# Patient Record
Sex: FEMALE | Race: WHITE | Employment: OTHER | URBAN - METROPOLITAN AREA
[De-identification: names, ages, dates, MRNs, and addresses within clinical notes are randomized per-mention and may not be internally consistent; named-entity substitution may affect disease eponyms.]

---

## 2021-09-13 ENCOUNTER — HOSPITAL ENCOUNTER (EMERGENCY)
Facility: CLINIC | Age: 86
Discharge: HOME | End: 2021-09-14
Attending: EMERGENCY MEDICINE
Payer: MEDICARE

## 2021-09-13 ENCOUNTER — APPOINTMENT (OUTPATIENT)
Dept: CT IMAGING | Facility: CLINIC | Age: 86
End: 2021-09-13
Payer: MEDICARE

## 2021-09-13 DIAGNOSIS — I26.99 ACUTE PULMONARY EMBOLISM WITHOUT ACUTE COR PULMONALE, UNSPECIFIED PULMONARY EMBOLISM TYPE (HCC): Primary | ICD-10-CM

## 2021-09-13 LAB
ABSOLUTE EOS #: 0.1 K/UL (ref 0–0.4)
ABSOLUTE IMMATURE GRANULOCYTE: ABNORMAL K/UL (ref 0–0.3)
ABSOLUTE LYMPH #: 1.1 K/UL (ref 1–4.8)
ABSOLUTE MONO #: 1.1 K/UL (ref 0.1–1.2)
ALBUMIN SERPL-MCNC: 3.1 G/DL (ref 3.5–5.2)
ALBUMIN/GLOBULIN RATIO: 0.8 (ref 1–2.5)
ALP BLD-CCNC: 104 U/L (ref 35–104)
ALT SERPL-CCNC: 12 U/L (ref 5–33)
ANION GAP SERPL CALCULATED.3IONS-SCNC: 12 MMOL/L (ref 9–17)
AST SERPL-CCNC: 18 U/L
BASOPHILS # BLD: 0 % (ref 0–2)
BASOPHILS ABSOLUTE: 0 K/UL (ref 0–0.2)
BILIRUB SERPL-MCNC: 0.5 MG/DL (ref 0.3–1.2)
BILIRUBIN DIRECT: 0.15 MG/DL
BILIRUBIN, INDIRECT: 0.35 MG/DL (ref 0–1)
BUN BLDV-MCNC: 20 MG/DL (ref 8–23)
BUN/CREAT BLD: ABNORMAL (ref 9–20)
CALCIUM SERPL-MCNC: 8.7 MG/DL (ref 8.6–10.4)
CHLORIDE BLD-SCNC: 96 MMOL/L (ref 98–107)
CO2: 23 MMOL/L (ref 20–31)
CREAT SERPL-MCNC: 1 MG/DL (ref 0.5–0.9)
D-DIMER QUANTITATIVE: 1.84 MG/L FEU
DIFFERENTIAL TYPE: ABNORMAL
EOSINOPHILS RELATIVE PERCENT: 1 % (ref 1–4)
GFR AFRICAN AMERICAN: >60 ML/MIN
GFR NON-AFRICAN AMERICAN: 52 ML/MIN
GFR SERPL CREATININE-BSD FRML MDRD: ABNORMAL ML/MIN/{1.73_M2}
GFR SERPL CREATININE-BSD FRML MDRD: ABNORMAL ML/MIN/{1.73_M2}
GLOBULIN: ABNORMAL G/DL (ref 1.5–3.8)
GLUCOSE BLD-MCNC: 99 MG/DL (ref 70–99)
HCT VFR BLD CALC: 37.2 % (ref 36–46)
HEMOGLOBIN: 12.1 G/DL (ref 12–16)
IMMATURE GRANULOCYTES: ABNORMAL %
LIPASE: 46 U/L (ref 13–60)
LYMPHOCYTES # BLD: 11 % (ref 24–44)
MCH RBC QN AUTO: 30.4 PG (ref 26–34)
MCHC RBC AUTO-ENTMCNC: 32.4 G/DL (ref 31–37)
MCV RBC AUTO: 93.8 FL (ref 80–100)
MONOCYTES # BLD: 11 % (ref 2–11)
NRBC AUTOMATED: ABNORMAL PER 100 WBC
PDW BLD-RTO: 14 % (ref 12.5–15.4)
PLATELET # BLD: 264 K/UL (ref 140–450)
PLATELET ESTIMATE: ABNORMAL
PMV BLD AUTO: 7.8 FL (ref 6–12)
POTASSIUM SERPL-SCNC: 5.1 MMOL/L (ref 3.7–5.3)
RBC # BLD: 3.97 M/UL (ref 4–5.2)
RBC # BLD: ABNORMAL 10*6/UL
SEG NEUTROPHILS: 77 % (ref 36–66)
SEGMENTED NEUTROPHILS ABSOLUTE COUNT: 7.9 K/UL (ref 1.8–7.7)
SODIUM BLD-SCNC: 131 MMOL/L (ref 135–144)
TOTAL PROTEIN: 6.8 G/DL (ref 6.4–8.3)
TROPONIN INTERP: ABNORMAL
TROPONIN INTERP: ABNORMAL
TROPONIN T: ABNORMAL NG/ML
TROPONIN T: ABNORMAL NG/ML
TROPONIN, HIGH SENSITIVITY: 20 NG/L (ref 0–14)
TROPONIN, HIGH SENSITIVITY: 20 NG/L (ref 0–14)
WBC # BLD: 10.3 K/UL (ref 3.5–11)
WBC # BLD: ABNORMAL 10*3/UL

## 2021-09-13 PROCEDURE — 93005 ELECTROCARDIOGRAM TRACING: CPT | Performed by: EMERGENCY MEDICINE

## 2021-09-13 PROCEDURE — 2580000003 HC RX 258: Performed by: EMERGENCY MEDICINE

## 2021-09-13 PROCEDURE — 96372 THER/PROPH/DIAG INJ SC/IM: CPT

## 2021-09-13 PROCEDURE — 80048 BASIC METABOLIC PNL TOTAL CA: CPT

## 2021-09-13 PROCEDURE — 84484 ASSAY OF TROPONIN QUANT: CPT

## 2021-09-13 PROCEDURE — 85025 COMPLETE CBC W/AUTO DIFF WBC: CPT

## 2021-09-13 PROCEDURE — 71260 CT THORAX DX C+: CPT

## 2021-09-13 PROCEDURE — 83690 ASSAY OF LIPASE: CPT

## 2021-09-13 PROCEDURE — 85379 FIBRIN DEGRADATION QUANT: CPT

## 2021-09-13 PROCEDURE — 99284 EMERGENCY DEPT VISIT MOD MDM: CPT

## 2021-09-13 PROCEDURE — 36415 COLL VENOUS BLD VENIPUNCTURE: CPT

## 2021-09-13 PROCEDURE — 80076 HEPATIC FUNCTION PANEL: CPT

## 2021-09-13 PROCEDURE — 6360000002 HC RX W HCPCS: Performed by: EMERGENCY MEDICINE

## 2021-09-13 PROCEDURE — 6370000000 HC RX 637 (ALT 250 FOR IP): Performed by: NURSE PRACTITIONER

## 2021-09-13 PROCEDURE — 6360000004 HC RX CONTRAST MEDICATION: Performed by: EMERGENCY MEDICINE

## 2021-09-13 RX ORDER — MAGNESIUM SULFATE 1 G/100ML
1000 INJECTION INTRAVENOUS PRN
Status: CANCELLED | OUTPATIENT
Start: 2021-09-13

## 2021-09-13 RX ORDER — POTASSIUM CHLORIDE 20 MEQ/1
40 TABLET, EXTENDED RELEASE ORAL PRN
Status: CANCELLED | OUTPATIENT
Start: 2021-09-13

## 2021-09-13 RX ORDER — LISINOPRIL 40 MG/1
40 TABLET ORAL DAILY
COMMUNITY
Start: 2020-11-06

## 2021-09-13 RX ORDER — ONDANSETRON 2 MG/ML
4 INJECTION INTRAMUSCULAR; INTRAVENOUS EVERY 6 HOURS PRN
Status: CANCELLED | OUTPATIENT
Start: 2021-09-13

## 2021-09-13 RX ORDER — PSEUDOEPHEDRINE HCL 30 MG
100 TABLET ORAL 3 TIMES DAILY PRN
COMMUNITY

## 2021-09-13 RX ORDER — LORATADINE 10 MG/1
10 TABLET ORAL DAILY
Status: ON HOLD | COMMUNITY
Start: 2021-04-06 | End: 2021-09-18

## 2021-09-13 RX ORDER — SODIUM CHLORIDE 0.9 % (FLUSH) 0.9 %
10 SYRINGE (ML) INJECTION PRN
Status: DISCONTINUED | OUTPATIENT
Start: 2021-09-13 | End: 2021-09-14 | Stop reason: HOSPADM

## 2021-09-13 RX ORDER — PSEUDOEPHEDRINE HCL 30 MG
100 TABLET ORAL 3 TIMES DAILY PRN
Status: CANCELLED | OUTPATIENT
Start: 2021-09-13

## 2021-09-13 RX ORDER — SODIUM CHLORIDE 9 MG/ML
25 INJECTION, SOLUTION INTRAVENOUS PRN
Status: CANCELLED | OUTPATIENT
Start: 2021-09-13

## 2021-09-13 RX ORDER — SODIUM CHLORIDE 0.9 % (FLUSH) 0.9 %
5-40 SYRINGE (ML) INJECTION EVERY 12 HOURS SCHEDULED
Status: CANCELLED | OUTPATIENT
Start: 2021-09-13

## 2021-09-13 RX ORDER — ACETAMINOPHEN 650 MG/1
650 SUPPOSITORY RECTAL EVERY 6 HOURS PRN
Status: DISCONTINUED | OUTPATIENT
Start: 2021-09-13 | End: 2021-09-14 | Stop reason: HOSPADM

## 2021-09-13 RX ORDER — MAGNESIUM CITRATE
296 SOLUTION, ORAL ORAL DAILY
Status: ON HOLD | COMMUNITY
End: 2021-09-18

## 2021-09-13 RX ORDER — POLYETHYLENE GLYCOL 3350 17 G/17G
17 POWDER, FOR SOLUTION ORAL DAILY PRN
Status: CANCELLED | OUTPATIENT
Start: 2021-09-13

## 2021-09-13 RX ORDER — AMLODIPINE BESYLATE 5 MG/1
10 TABLET ORAL DAILY
COMMUNITY
Start: 2021-06-09

## 2021-09-13 RX ORDER — SODIUM CHLORIDE 0.9 % (FLUSH) 0.9 %
10 SYRINGE (ML) INJECTION PRN
Status: CANCELLED | OUTPATIENT
Start: 2021-09-13

## 2021-09-13 RX ORDER — POTASSIUM CHLORIDE 7.45 MG/ML
10 INJECTION INTRAVENOUS PRN
Status: CANCELLED | OUTPATIENT
Start: 2021-09-13

## 2021-09-13 RX ORDER — 0.9 % SODIUM CHLORIDE 0.9 %
70 INTRAVENOUS SOLUTION INTRAVENOUS ONCE
Status: COMPLETED | OUTPATIENT
Start: 2021-09-13 | End: 2021-09-13

## 2021-09-13 RX ORDER — ONDANSETRON 4 MG/1
4 TABLET, ORALLY DISINTEGRATING ORAL EVERY 8 HOURS PRN
Status: CANCELLED | OUTPATIENT
Start: 2021-09-13

## 2021-09-13 RX ORDER — ACETAMINOPHEN 325 MG/1
650 TABLET ORAL EVERY 6 HOURS PRN
Status: DISCONTINUED | OUTPATIENT
Start: 2021-09-13 | End: 2021-09-14 | Stop reason: HOSPADM

## 2021-09-13 RX ADMIN — ENOXAPARIN SODIUM 60 MG: 60 INJECTION SUBCUTANEOUS at 18:40

## 2021-09-13 RX ADMIN — ACETAMINOPHEN 650 MG: 325 TABLET ORAL at 23:28

## 2021-09-13 RX ADMIN — SODIUM CHLORIDE 70 ML: 9 INJECTION, SOLUTION INTRAVENOUS at 16:33

## 2021-09-13 RX ADMIN — IOPAMIDOL 75 ML: 755 INJECTION, SOLUTION INTRAVENOUS at 16:33

## 2021-09-13 RX ADMIN — Medication 10 ML: at 16:33

## 2021-09-13 ASSESSMENT — PAIN DESCRIPTION - ORIENTATION: ORIENTATION: RIGHT

## 2021-09-13 ASSESSMENT — ENCOUNTER SYMPTOMS
ABDOMINAL PAIN: 0
RHINORRHEA: 0
SHORTNESS OF BREATH: 0
SORE THROAT: 0
CHEST TIGHTNESS: 1
VOMITING: 0
PHOTOPHOBIA: 0
CONSTIPATION: 1
NAUSEA: 0
DIARRHEA: 0
BACK PAIN: 0
COUGH: 0

## 2021-09-13 ASSESSMENT — PAIN SCALES - GENERAL
PAINLEVEL_OUTOF10: 7
PAINLEVEL_OUTOF10: 0

## 2021-09-13 ASSESSMENT — PAIN DESCRIPTION - LOCATION: LOCATION: CHEST

## 2021-09-13 ASSESSMENT — PAIN DESCRIPTION - FREQUENCY: FREQUENCY: CONTINUOUS

## 2021-09-13 ASSESSMENT — PAIN DESCRIPTION - DESCRIPTORS: DESCRIPTORS: ACHING

## 2021-09-13 NOTE — ED PROVIDER NOTES
[address] [place] [address] ED  15 [facility]  Phone: [phone]        Pt Name: Shobha English  MRN: [id number]  [place] 7/29/1930  Date of evaluation: 9/13/21      CHIEF COMPLAINT     Chief Complaint   Patient presents with    Fatigue    Shortness of Breath    Chest Pain     check her pneumonia   She was in the hospital 24 days prior for 6 days, today she started having chest pain under her right breast.          HISTORY OF PRESENT ILLNESS  (Location/Symptom, Timing/Onset, Context/Setting, Quality, Duration, Modifying Factors, Severity.)    Shobha English is a 80 y.o. female who presents with right upper quadrant abdominal pain. She has exacerbation of the pain with breathing and moving. No fever or chills. She is not nauseated. No hemoptysis. She has been light-headed. She had Covid-19 and spent 6 days in the hospital. She was diagnosed on 8/27/21. She was admitted at [facility] for [address] Her symptoms started on 8/20/21. She is unvaccinated. No diarrhea. She denies shortness of breath. The patient has no prior history of venous thromboembolism. No recent travel. No recent surgery. No leg swelling. No hemoptysis. No estrogen containing medications. No history of malignancy. REVIEW OF SYSTEMS    (2-9 systems for level 4, 10 or more for level 5)     Review of Systems   Constitutional: Positive for chills. Negative for fever. HENT: Negative for congestion, rhinorrhea and sore throat. Eyes: Negative for photophobia and visual disturbance. Respiratory: Positive for chest tightness. Negative for cough and shortness of breath. Cardiovascular: Positive for chest pain and palpitations. Negative for leg swelling. Gastrointestinal: Positive for constipation. Negative for abdominal pain, diarrhea, nausea and vomiting. Genitourinary: Negative for dysuria, frequency and urgency. Musculoskeletal: Negative for back pain and neck pain. Skin: Negative for rash and wound. Neurological: Positive for dizziness and light-headedness. Negative for headaches. Hematological: Negative for adenopathy. Does not bruise/bleed easily. PAST MEDICAL HISTORY    has a past medical history of Chronic kidney disease, COVID-19, Hyperlipidemia, and Hypertension. Pelvic fracture    SURGICAL HISTORY     Cholecystectomy     CURRENTMEDICATIONS       Previous Medications    AMLODIPINE (NORVASC) 5 MG TABLET    Take 5 mg by mouth daily    DOCUSATE (COLACE, DULCOLAX) 100 MG CAPS    Take 100 mg by mouth 3 times daily as needed    LISINOPRIL (PRINIVIL;ZESTRIL) 40 MG TABLET    Take 40 mg by mouth daily    LORATADINE (CLARITIN) 10 MG TABLET    Take 10 mg by mouth daily    MAGNESIUM CITRATE (CITROMA) SOLN    Take 296 mLs by mouth once    MULTIPLE VITAMINS-MINERALS (MULTIVITAMIN ADULTS PO)    Take 1 capsule by mouth daily    VITAMIN D (CHOLECALCIFEROL) 125 MCG (5000 UT) CAPS CAPSULE    Take 5,000 Units by mouth daily       ALLERGIES     is allergic to codeine and morphine. FAMILY HISTORY     has no family status information on file. family history is not on file. SOCIAL HISTORY      reports that she has never smoked. She has never used smokeless tobacco. She reports that she does not drink alcohol and does not use drugs. PHYSICAL EXAM    (up to 7 for level 4, 8 or more for level 5)   INITIAL VITALS:  height is 5' 3\" (1.6 m) and weight is 57.6 kg (127 lb). Her oral temperature is 98.2 °F (36.8 °C). Her blood pressure is 121/51 (abnormal) and her pulse is 62. Her respiration is 14 and oxygen saturation is 95%. Physical Exam  Vitals and nursing note reviewed. Constitutional:       Appearance: She is well-developed. HENT:      Head: Normocephalic and atraumatic. Cardiovascular:      Rate and Rhythm: Normal rate and regular rhythm. Heart sounds: No murmur heard. No friction rub. No gallop.     Pulmonary:      Effort: Pulmonary effort is normal.      Breath sounds: Normal breath sounds. No decreased breath sounds, wheezing, rhonchi or rales. Chest:      Chest wall: No tenderness. Abdominal:      Palpations: Abdomen is soft. Musculoskeletal:         General: Normal range of motion. Cervical back: Normal range of motion and neck supple. Right lower leg: No tenderness. No edema. Left lower leg: No tenderness. No edema. Skin:     General: Skin is warm and dry. Capillary Refill: Capillary refill takes less than 2 seconds. Neurological:      General: No focal deficit present. Mental Status: She is alert and oriented to person, place, and time. Psychiatric:         Mood and Affect: Mood normal.         Behavior: Behavior normal.         DIFFERENTIAL DIAGNOSIS/ MDM:     Acute pulmonary embolism. Plan for admission. DIAGNOSTIC RESULTS     EKG: All EKG's are interpreted by the Emergency Department Physician who either signs or Co-signs this chart in the absence of a cardiologist.    EKG Interpretation    Interpreted by emergency department physician    Rhythm: normal sinus   Rate: normal  Axis: left  Ectopy: none  Conduction: right bundle branch block (complete) and left anterior fasciclar block  ST Segments: nonspecific changes and elevation in  v2  T Waves: non specific changes  Q Waves: none    Clinical Impression: non-specific EKG    Winston Plata MD      RADIOLOGY:        Interpretation per the Radiologist below, if available at the time of this note:    CT CHEST PULMONARY EMBOLISM W CONTRAST    Result Date: 9/13/2021  EXAMINATION: CTA OF THE CHEST 9/13/2021 10:14 am TECHNIQUE: CTA of the chest was performed after the administration of intravenous contrast.  Multiplanar reformatted images are provided for review. MIP images are provided for review. Dose modulation, iterative reconstruction, and/or weight based adjustment of the mA/kV was utilized to reduce the radiation dose to as low as reasonably achievable. COMPARISON: None.  HISTORY: ORDERING SYSTEM PROVIDED HISTORY: r/o PE TECHNOLOGIST PROVIDED HISTORY: r/o PE Decision Support Exception - unselect if not a suspected or confirmed emergency medical condition->Emergency Medical Condition (MA) Reason for Exam: Elevated D Dimer, fatigue, SOB, chest pain Acuity: Acute Type of Exam: Initial FINDINGS: Pulmonary Arteries: Pulmonary arteries are adequately opacified for evaluation. Filling defects are present within right upper lobe segmental and subsegmental branches, and within the right and left lower lobe segmental and subsegmental branches, consistent with multiple acute emboli. RV to LV ratio measures 0.93. Main pulmonary artery is enlarged, measuring 34 mm, suggesting pulmonary arterial hypertension. No filling defects are present within the main or central pulmonary arteries. .  Main pulmonary artery is normal in caliber. Mediastinum: A 1.3 cm low-attenuation nodule is present within the left lobe of the thyroid gland. Extensive coronary arterial calcifications are noted. Small pericardial effusion is present. No evidence of mediastinal lymphadenopathy. The heart and pericardium demonstrate no acute abnormality. There is no acute abnormality of the thoracic aorta. Lungs/pleura: Scattered pulmonary opacities are present bilaterally, with a lower lobe predominance. Small pleural effusions are present. No pneumothorax is noted. Upper Abdomen: Images through the upper abdomen do not demonstrate the left kidney, which may be surgically absent or ectopic in location. Soft Tissues/Bones: No acute bone or soft tissue abnormality. 1. Segmental and subsegmental pulmonary emboli within the right upper lobe, and right and left lower lobes, without evidence of RV strain 2. Multiple ground-glass opacities and areas of consolidation bilaterally, differential considerations include COVID-19 pulmonary disease versus multifocal pneumonia 3. Small bilateral pleural effusions 4.  Extensive coronary arterial calcifications 5. Critical results were called by Dr. Arely Hall to Dr Batsheva Nation on 9/13/2021 at 5:12 p.m. hours. RECOMMENDATIONS: 1.3 cm incidental left thyroid nodule. No follow-up imaging is recommended. Reference: J Am Trina Radiol.  2015 Feb;12(2): 143-50       LABS:  Results for orders placed or performed during the hospital encounter of 94/90/25   Basic Metabolic Panel   Result Value Ref Range    Glucose 99 70 - 99 mg/dL    BUN 20 8 - 23 mg/dL    CREATININE 1.00 (H) 0.50 - 0.90 mg/dL    Bun/Cre Ratio NOT REPORTED 9 - 20    Calcium 8.7 8.6 - 10.4 mg/dL    Sodium 131 (L) 135 - 144 mmol/L    Potassium 5.1 3.7 - 5.3 mmol/L    Chloride 96 (L) 98 - 107 mmol/L    CO2 23 20 - 31 mmol/L    Anion Gap 12 9 - 17 mmol/L    GFR Non-African American 52 (L) >60 mL/min    GFR African American >60 >60 mL/min    GFR Comment          GFR Staging NOT REPORTED    D-Dimer, Quantitative   Result Value Ref Range    D-Dimer, Quant 1.84 mg/L FEU   CBC Auto Differential   Result Value Ref Range    WBC 10.3 3.5 - 11.0 k/uL    RBC 3.97 (L) 4.0 - 5.2 m/uL    Hemoglobin 12.1 12.0 - 16.0 g/dL    Hematocrit 37.2 36 - 46 %    MCV 93.8 80 - 100 fL    MCH 30.4 26 - 34 pg    MCHC 32.4 31 - 37 g/dL    RDW 14.0 12.5 - 15.4 %    Platelets 249 429 - 703 k/uL    MPV 7.8 6.0 - 12.0 fL    NRBC Automated NOT REPORTED per 100 WBC    Differential Type NOT REPORTED     Seg Neutrophils 77 (H) 36 - 66 %    Lymphocytes 11 (L) 24 - 44 %    Monocytes 11 2 - 11 %    Eosinophils % 1 1 - 4 %    Basophils 0 0 - 2 %    Immature Granulocytes NOT REPORTED 0 %    Segs Absolute 7.90 (H) 1.8 - 7.7 k/uL    Absolute Lymph # 1.10 1.0 - 4.8 k/uL    Absolute Mono # 1.10 0.1 - 1.2 k/uL    Absolute Eos # 0.10 0.0 - 0.4 k/uL    Basophils Absolute 0.00 0.0 - 0.2 k/uL    Absolute Immature Granulocyte NOT REPORTED 0.00 - 0.30 k/uL    WBC Morphology NOT REPORTED     RBC Morphology NOT REPORTED     Platelet Estimate NOT REPORTED    Troponin   Result Value Ref Range    Troponin, mis-transcribed.)    Perry Keyes MD  Attending Emergency Medicine Physician       Perry Keyes MD  09/13/21 0667

## 2021-09-13 NOTE — ED NOTES
[facility] Access calls back stating that all [facility] facilities are closed to transfers, [facility] to [facility] for transfer per patient and families 2nd request      Chela Bowen RN  09/13/21 6580

## 2021-09-13 NOTE — ED NOTES
Dr. Brian Duran speaks with Ellen Erazo regarding transfer to [address] [place] [address], RN  09/13/21 8404

## 2021-09-13 NOTE — ED NOTES
Kavon Jarquin RN to bedside to attempt IV start for CT scan      Jonathan Hebert, ANGELA  09/13/21 5856

## 2021-09-14 VITALS
SYSTOLIC BLOOD PRESSURE: 133 MMHG | BODY MASS INDEX: 22.5 KG/M2 | RESPIRATION RATE: 18 BRPM | HEIGHT: 63 IN | DIASTOLIC BLOOD PRESSURE: 57 MMHG | TEMPERATURE: 98.2 F | HEART RATE: 82 BPM | WEIGHT: 127 LBS | OXYGEN SATURATION: 96 %

## 2021-09-14 LAB
ANION GAP SERPL CALCULATED.3IONS-SCNC: 9 MMOL/L (ref 9–17)
BUN BLDV-MCNC: 15 MG/DL (ref 8–23)
BUN/CREAT BLD: ABNORMAL (ref 9–20)
CALCIUM SERPL-MCNC: 8.5 MG/DL (ref 8.6–10.4)
CHLORIDE BLD-SCNC: 99 MMOL/L (ref 98–107)
CO2: 25 MMOL/L (ref 20–31)
CREAT SERPL-MCNC: 1 MG/DL (ref 0.5–0.9)
EKG ATRIAL RATE: 64 BPM
EKG P AXIS: 77 DEGREES
EKG P-R INTERVAL: 158 MS
EKG Q-T INTERVAL: 422 MS
EKG QRS DURATION: 130 MS
EKG QTC CALCULATION (BAZETT): 435 MS
EKG R AXIS: -46 DEGREES
EKG T AXIS: 68 DEGREES
EKG VENTRICULAR RATE: 64 BPM
GFR AFRICAN AMERICAN: >60 ML/MIN
GFR NON-AFRICAN AMERICAN: 52 ML/MIN
GFR SERPL CREATININE-BSD FRML MDRD: ABNORMAL ML/MIN/{1.73_M2}
GFR SERPL CREATININE-BSD FRML MDRD: ABNORMAL ML/MIN/{1.73_M2}
GLUCOSE BLD-MCNC: 82 MG/DL (ref 70–99)
HCT VFR BLD CALC: 34.6 % (ref 36–46)
HEMOGLOBIN: 11.5 G/DL (ref 12–16)
INR BLD: 0.9
MCH RBC QN AUTO: 30.2 PG (ref 26–34)
MCHC RBC AUTO-ENTMCNC: 33.3 G/DL (ref 31–37)
MCV RBC AUTO: 90.6 FL (ref 80–100)
NRBC AUTOMATED: ABNORMAL PER 100 WBC
PDW BLD-RTO: 13.2 % (ref 12.5–15.4)
PLATELET # BLD: 188 K/UL (ref 140–450)
PMV BLD AUTO: 8.1 FL (ref 6–12)
POTASSIUM SERPL-SCNC: 4.6 MMOL/L (ref 3.7–5.3)
PROTHROMBIN TIME: 9.2 SEC (ref 9.4–12.6)
RBC # BLD: 3.81 M/UL (ref 4–5.2)
SODIUM BLD-SCNC: 133 MMOL/L (ref 135–144)
WBC # BLD: 6.7 K/UL (ref 3.5–11)

## 2021-09-14 PROCEDURE — 85610 PROTHROMBIN TIME: CPT

## 2021-09-14 PROCEDURE — 6370000000 HC RX 637 (ALT 250 FOR IP): Performed by: NURSE PRACTITIONER

## 2021-09-14 PROCEDURE — 82805 BLOOD GASES W/O2 SATURATION: CPT

## 2021-09-14 PROCEDURE — 85027 COMPLETE CBC AUTOMATED: CPT

## 2021-09-14 PROCEDURE — 80048 BASIC METABOLIC PNL TOTAL CA: CPT

## 2021-09-14 RX ORDER — CETIRIZINE HYDROCHLORIDE 10 MG/1
10 TABLET ORAL DAILY
Status: DISCONTINUED | OUTPATIENT
Start: 2021-09-14 | End: 2021-09-14 | Stop reason: HOSPADM

## 2021-09-14 RX ORDER — LISINOPRIL 10 MG/1
40 TABLET ORAL DAILY
Status: DISCONTINUED | OUTPATIENT
Start: 2021-09-14 | End: 2021-09-14 | Stop reason: HOSPADM

## 2021-09-14 RX ORDER — AMLODIPINE BESYLATE 5 MG/1
5 TABLET ORAL DAILY
Status: DISCONTINUED | OUTPATIENT
Start: 2021-09-14 | End: 2021-09-14 | Stop reason: HOSPADM

## 2021-09-14 RX ADMIN — APIXABAN 10 MG: 5 TABLET, FILM COATED ORAL at 09:24

## 2021-09-14 RX ADMIN — LISINOPRIL 40 MG: 10 TABLET ORAL at 09:25

## 2021-09-14 RX ADMIN — AMLODIPINE BESYLATE 5 MG: 5 TABLET ORAL at 09:25

## 2021-09-14 NOTE — ED NOTES
Dr. Kim Pate speaks with Miryam Hernandez at [facility] for updated patient status and ability to discharge from here to home after successful start of eliquis, Dr. Kim Pate to bedside to reassess patient and update on plan to discharge home, patient and family are agreeable to this plan, [place] [facility] notified of plan to cancel admission bed       Mitch Davis RN  09/14/21 1786

## 2021-09-14 NOTE — ED PROVIDER NOTES
[facility] ED  15 [facility]  Phone: [phone]        ADDENDUM:      Care of this patient was assumed from Dr. Trevor Tello. The patient was seen for Fatigue, Shortness of Breath, and Chest Pain (check her pneumonia   She was in the hospital 24 days prior for 6 days, today she started having chest pain under her right breast. )  . The patient's initial evaluation and plan have been discussed with the prior provider who initially evaluated the patient. Nursing Notes, Past Medical Hx, Past Surgical Hx, Allergies, were all reviewed. PAST MEDICAL HISTORY    has a past medical history of Chronic kidney disease, COVID-19, Hyperlipidemia, and Hypertension. SURGICAL HISTORY      has no past surgical history on file. CURRENT MEDICATIONS       Previous Medications    AMLODIPINE (NORVASC) 5 MG TABLET    Take 5 mg by mouth daily    DOCUSATE (COLACE, DULCOLAX) 100 MG CAPS    Take 100 mg by mouth 3 times daily as needed    LISINOPRIL (PRINIVIL;ZESTRIL) 40 MG TABLET    Take 40 mg by mouth daily    LORATADINE (CLARITIN) 10 MG TABLET    Take 10 mg by mouth daily    MAGNESIUM CITRATE (CITROMA) SOLN    Take 296 mLs by mouth once    MULTIPLE VITAMINS-MINERALS (MULTIVITAMIN ADULTS PO)    Take 1 capsule by mouth daily    VITAMIN D (CHOLECALCIFEROL) 125 MCG (5000 UT) CAPS CAPSULE    Take 5,000 Units by mouth daily       ALLERGIES     is allergic to codeine and morphine. Diagnostic Results     EKG: All EKG's are interpreted by the Emergency Department Physician who either signs or Co-signs this chart in the [address] a cardiologist.      Interpreted by Janis Zamora MD     Rhythm: normal sinus   Rate: 64  Axis: -46  Ectopy: none  Conduction: Bifascicular block  ST Segments: no acute change  T Waves: no acute change  Q Waves: none    Clinical Impression: normal sinus rhythm with no acute changes/normal EKG. No acute infarction/ischemia noted.       LABS:   Results for orders placed or performed during the hospital encounter of 46/07/24   Basic Metabolic Panel   Result Value Ref Range    Glucose 99 70 - 99 mg/dL    BUN 20 8 - 23 mg/dL    CREATININE 1.00 (H) 0.50 - 0.90 mg/dL    Bun/Cre Ratio NOT REPORTED 9 - 20    Calcium 8.7 8.6 - 10.4 mg/dL    Sodium 131 (L) 135 - 144 mmol/L    Potassium 5.1 3.7 - 5.3 mmol/L    Chloride 96 (L) 98 - 107 mmol/L    CO2 23 20 - 31 mmol/L    Anion Gap 12 9 - 17 mmol/L    GFR Non-African American 52 (L) >60 mL/min    GFR African American >60 >60 mL/min    GFR Comment          GFR Staging NOT REPORTED    D-Dimer, Quantitative   Result Value Ref Range    D-Dimer, Quant 1.84 mg/L FEU   CBC Auto Differential   Result Value Ref Range    WBC 10.3 3.5 - 11.0 k/uL    RBC 3.97 (L) 4.0 - 5.2 m/uL    Hemoglobin 12.1 12.0 - 16.0 g/dL    Hematocrit 37.2 36 - 46 %    MCV 93.8 80 - 100 fL    MCH 30.4 26 - 34 pg    MCHC 32.4 31 - 37 g/dL    RDW 14.0 12.5 - 15.4 %    Platelets 263 725 - 098 k/uL    MPV 7.8 6.0 - 12.0 fL    NRBC Automated NOT REPORTED per 100 WBC    Differential Type NOT REPORTED     Seg Neutrophils 77 (H) 36 - 66 %    Lymphocytes 11 (L) 24 - 44 %    Monocytes 11 2 - 11 %    Eosinophils % 1 1 - 4 %    Basophils 0 0 - 2 %    Immature Granulocytes NOT REPORTED 0 %    Segs Absolute 7.90 (H) 1.8 - 7.7 k/uL    Absolute Lymph # 1.10 1.0 - 4.8 k/uL    Absolute Mono # 1.10 0.1 - 1.2 k/uL    Absolute Eos # 0.10 0.0 - 0.4 k/uL    Basophils Absolute 0.00 0.0 - 0.2 k/uL    Absolute Immature Granulocyte NOT REPORTED 0.00 - 0.30 k/uL    WBC Morphology NOT REPORTED     RBC Morphology NOT REPORTED     Platelet Estimate NOT REPORTED    Troponin   Result Value Ref Range    Troponin, High Sensitivity 20 (H) 0 - 14 ng/L    Troponin T NOT REPORTED <0.03 ng/mL    Troponin Interp NOT REPORTED    Lipase   Result Value Ref Range    Lipase 46 13 - 60 U/L   Hepatic Function Panel   Result Value Ref Range    Albumin 3.1 (L) 3.5 - 5.2 g/dL    Alkaline Phosphatase 104 35 - 104 U/L    ALT 12 5 - 33 U/L    AST 18 <32 U/L    Total Bilirubin 0.50 0.3 - 1.2 mg/dL    Bilirubin, Direct 0.15 <0.31 mg/dL    Bilirubin, Indirect 0.35 0.00 - 1.00 mg/dL    Total Protein 6.8 6.4 - 8.3 g/dL    Globulin NOT REPORTED 1.5 - 3.8 g/dL    Albumin/Globulin Ratio 0.8 (L) 1.0 - 2.5   Troponin   Result Value Ref Range    Troponin, High Sensitivity 20 (H) 0 - 14 ng/L    Troponin T NOT REPORTED <0.03 ng/mL    Troponin Interp NOT REPORTED    Basic Metabolic Panel w/ Reflex to MG   Result Value Ref Range    Glucose 82 70 - 99 mg/dL    BUN 15 8 - 23 mg/dL    CREATININE 1.00 (H) 0.50 - 0.90 mg/dL    Bun/Cre Ratio NOT REPORTED 9 - 20    Calcium 8.5 (L) 8.6 - 10.4 mg/dL    Sodium 133 (L) 135 - 144 mmol/L    Potassium 4.6 3.7 - 5.3 mmol/L    Chloride 99 98 - 107 mmol/L    CO2 25 20 - 31 mmol/L    Anion Gap 9 9 - 17 mmol/L    GFR Non-African American 52 (L) >60 mL/min    GFR African American >60 >60 mL/min    GFR Comment          GFR Staging NOT REPORTED    CBC   Result Value Ref Range    WBC 6.7 3.5 - 11.0 k/uL    RBC 3.81 (L) 4.0 - 5.2 m/uL    Hemoglobin 11.5 (L) 12.0 - 16.0 g/dL    Hematocrit 34.6 (L) 36 - 46 %    MCV 90.6 80 - 100 fL    MCH 30.2 26 - 34 pg    MCHC 33.3 31 - 37 g/dL    RDW 13.2 12.5 - 15.4 %    Platelets 597 536 - 776 k/uL    MPV 8.1 6.0 - 12.0 fL    NRBC Automated NOT REPORTED per 100 WBC   Protime-INR   Result Value Ref Range    Protime 9.2 (L) 9.4 - 12.6 sec    INR 0.9    EKG 12 Lead   Result Value Ref Range    Ventricular Rate 64 BPM    Atrial Rate 64 BPM    P-R Interval 158 ms    QRS Duration 130 ms    Q-T Interval 422 ms    QTc Calculation (Bazett) 435 ms    P Axis 77 degrees    R Axis -46 degrees    T Axis 68 degrees       RADIOLOGY:  CT CHEST PULMONARY EMBOLISM W CONTRAST   Final Result   1. Segmental and subsegmental pulmonary emboli within the right upper lobe,   and right and left lower lobes, without evidence of RV strain   2.  Multiple ground-glass opacities and areas of consolidation bilaterally,   differential considerations include COVID-19 pulmonary disease versus   multifocal pneumonia   3. Small bilateral pleural effusions   4. Extensive coronary arterial calcifications   5. Critical results were called by Dr. Henri Parikh to Dr Claude Comber on   9/13/2021 at 5:12 p.m. hours. RECOMMENDATIONS:   1.3 cm incidental left thyroid nodule. No follow-up imaging is recommended. Reference: J Am Trina Radiol.  2015 Feb;12(2): 143-50             RECENT VITALS:  BP: (!) 113/52, Temp: 98.2 °F (36.8 °C), Pulse: 64, Resp: 16     ED Course     The patient was given the following medications:  Orders Placed This Encounter   Medications    0.9 % sodium chloride bolus    iopamidol (ISOVUE-370) 76 % injection 75 mL    sodium chloride flush 0.9 % injection 10 mL    DISCONTD: enoxaparin (LOVENOX) injection 60 mg    enoxaparin (LOVENOX) injection 60 mg    amLODIPine (NORVASC) tablet 5 mg    lisinopril (PRINIVIL;ZESTRIL) tablet 40 mg    cetirizine (ZYRTEC) tablet 10 mg    OR Linked Order Group     acetaminophen (TYLENOL) tablet 650 mg     acetaminophen (TYLENOL) suppository 650 mg    apixaban (ELIQUIS) tablet 10 mg    apixaban (ELIQUIS) 5 MG TABS tablet     Sig: Take 2 tablets by mouth 2 times daily for 7 days     Dispense:  28 tablet     Refill:  0       Medical Decision Making           Our hospitalist service was called as the patient has been in this emergency department for greater than 24 hours she is already transition to Eliquis she has not been tachycardic she has not been hypoxic they are recommending that we discharge the patient from the emergency department to continue the Eliquis for the pulmonary emboli on examination the patient's only complaint is of right rib pain which is what originally brought her in no other chest pain no shortness of breath no fever no chills no vomiting I discussed this with the patient she is agreeable with this plan on my examination the head is normocephalic ears nose throat all without obvious mass lesion or deformity the neck is supple trachea midline no adenopathy cardiac S1-S2 with a regular rate and rhythm pulmonary clear to auscultation bilateral abdomen is soft nontender nondistended with positive bowel sounds extremities warm dry good pulses motor sensation throughout the skin is without rashes or lesions neurologic GCS is 15 and no focal deficits are appreciated    EMERGENCY DEPARTMENT COURSE:   Vitals:    Vitals:    09/14/21 0726 09/14/21 0928 09/14/21 1011 09/14/21 1125   BP: (!) 135/57 (!) 122/54 (!) 117/46 (!) 113/52   Pulse: 66 73 70 64   Resp: 16 18 19 16   Temp:       TempSrc:       SpO2: 96% 98% 96% 95%   Weight:       Height:         -------------------------  BP: (!) 113/52, Temp: 98.2 °F (36.8 °C), Pulse: 64, Resp: 16      RE-EVALUATION:  At this time the patient is without objective evidence of an acute process requiring hospitalization or inpatient management. They have remained hemodynamically stable throughout their entire ED visit and are stable for discharge with outpatient follow-up. The patient understands that at this time there is no evidence for a more malignant underlying process, but the patient also understands that early in the process of an illness or injury, an emergency department workup can be falsely reassuring. Routine discharge counseling was given, and the patient understands that worsening, changing or persistent symptoms should prompt an immediate call or follow up with their primary physician or return to the emergency department. The importance of appropriate follow up was also discussed. I have reviewed the disposition diagnosis with the patient and or their family/guardian. I have answered their questions and given discharge instructions. They voiced understanding of these instructions and did not have any further questions or complaints. PROCEDURES:  None    FINAL IMPRESSION      1.  Acute pulmonary embolism without acute cor pulmonale, unspecified pulmonary embolism type [facility])          DISPOSITION/PLAN   DISPOSITION Decision To Discharge 09/14/2021 01:37:24 PM      CONDITION ON DISPOSITION:   Stable    PATIENT REFERRED TO:  Saud Hughes  [address] [place] [address], [place] [address]  [phone]    Call today        DISCHARGE MEDICATIONS:  New Prescriptions    APIXABAN (ELIQUIS) 5 MG TABS TABLET    Take 2 tablets by mouth 2 times daily for 7 days       (Please note that portions of this note were completed with a voicerecognition program.  Efforts were made to edit the dictations but occasionally words are mis-transcribed.)    Hanane Almonte MD,, MD, F.A.C.E.P.   Attending Emergency Medicine Physician                   Hanane Almonte MD  09/14/21 1848

## 2021-09-14 NOTE — ED NOTES
Pt resting on stretcher with eyes closed, no acute distress noted, continue to monitor      Chela ANGELA Bowen  09/14/21 1291

## 2021-09-14 NOTE — ED NOTES
Pt up to restroom with assistance no distress noted, returned to stretcher, continue to monitor      Rose Marie Rich RN  09/14/21 0240

## 2021-09-14 NOTE — ED NOTES
Bed board contacted regarding status of admission bed, updated by Holley Sandhoff that bed is assigned although is awaiting cleaning and should be done in approximately 2 hours, patient and family updated      Yessenia Alanis RN  09/14/21 1200

## 2021-09-14 NOTE — ED NOTES
Report received from [facility], pt resting on stretcher without further complaints at present time, provided with warm blankets for comfort, updated on plan of care, continue to await admission ed at [facility], vitals as charted      Lindsay Galicia RN  09/14/21 8902

## 2021-09-14 NOTE — ED NOTES
Family updated on plan of care, continue to await admission bed at [facility], pt resting on stretcher with eyes closed, resp even and non-labored, continue to monitor, vitals as charted      Jared Potter RN  09/14/21 2604

## 2021-09-17 ENCOUNTER — HOSPITAL ENCOUNTER (INPATIENT)
Age: 86
LOS: 3 days | Discharge: HOME HEALTH | DRG: 175 | End: 2021-09-20
Attending: EMERGENCY MEDICINE | Admitting: INTERNAL MEDICINE
Payer: MEDICARE

## 2021-09-17 ENCOUNTER — APPOINTMENT (OUTPATIENT)
Dept: CT IMAGING | Facility: CLINIC | Age: 86
DRG: 175 | End: 2021-09-17
Payer: MEDICARE

## 2021-09-17 DIAGNOSIS — R06.03 ACUTE RESPIRATORY DISTRESS: ICD-10-CM

## 2021-09-17 DIAGNOSIS — I26.99 ACUTE PULMONARY EMBOLISM, UNSPECIFIED PULMONARY EMBOLISM TYPE, UNSPECIFIED WHETHER ACUTE COR PULMONALE PRESENT (HCC): ICD-10-CM

## 2021-09-17 DIAGNOSIS — I26.09 ACUTE PULMONARY EMBOLISM WITH ACUTE COR PULMONALE, UNSPECIFIED PULMONARY EMBOLISM TYPE (HCC): Primary | ICD-10-CM

## 2021-09-17 LAB
ABSOLUTE EOS #: 0.1 K/UL (ref 0–0.4)
ABSOLUTE IMMATURE GRANULOCYTE: ABNORMAL K/UL (ref 0–0.3)
ABSOLUTE LYMPH #: 1 K/UL (ref 1–4.8)
ABSOLUTE MONO #: 0.5 K/UL (ref 0.1–1.2)
ALBUMIN SERPL-MCNC: 3.3 G/DL (ref 3.5–5.2)
ALBUMIN/GLOBULIN RATIO: 1 (ref 1–2.5)
ALP BLD-CCNC: 107 U/L (ref 35–104)
ALT SERPL-CCNC: 16 U/L (ref 5–33)
ANION GAP SERPL CALCULATED.3IONS-SCNC: 10 MMOL/L (ref 9–17)
AST SERPL-CCNC: 17 U/L
BASOPHILS # BLD: 1 % (ref 0–2)
BASOPHILS ABSOLUTE: 0 K/UL (ref 0–0.2)
BILIRUB SERPL-MCNC: 0.2 MG/DL (ref 0.3–1.2)
BNP INTERPRETATION: ABNORMAL
BUN BLDV-MCNC: 19 MG/DL (ref 8–23)
BUN/CREAT BLD: ABNORMAL (ref 9–20)
CALCIUM SERPL-MCNC: 9.2 MG/DL (ref 8.6–10.4)
CHLORIDE BLD-SCNC: 100 MMOL/L (ref 98–107)
CO2: 23 MMOL/L (ref 20–31)
CREAT SERPL-MCNC: 1.1 MG/DL (ref 0.5–0.9)
DIFFERENTIAL TYPE: ABNORMAL
EOSINOPHILS RELATIVE PERCENT: 2 % (ref 1–4)
GFR AFRICAN AMERICAN: 56 ML/MIN
GFR NON-AFRICAN AMERICAN: 47 ML/MIN
GFR SERPL CREATININE-BSD FRML MDRD: ABNORMAL ML/MIN/{1.73_M2}
GFR SERPL CREATININE-BSD FRML MDRD: ABNORMAL ML/MIN/{1.73_M2}
GLUCOSE BLD-MCNC: 107 MG/DL (ref 70–99)
HCT VFR BLD CALC: 34.9 % (ref 36–46)
HEMOGLOBIN: 11.7 G/DL (ref 12–16)
IMMATURE GRANULOCYTES: ABNORMAL %
LYMPHOCYTES # BLD: 16 % (ref 24–44)
MCH RBC QN AUTO: 30.6 PG (ref 26–34)
MCHC RBC AUTO-ENTMCNC: 33.5 G/DL (ref 31–37)
MCV RBC AUTO: 91.3 FL (ref 80–100)
MONOCYTES # BLD: 8 % (ref 2–11)
NRBC AUTOMATED: ABNORMAL PER 100 WBC
PDW BLD-RTO: 14 % (ref 12.5–15.4)
PLATELET # BLD: 316 K/UL (ref 140–450)
PLATELET ESTIMATE: ABNORMAL
PMV BLD AUTO: 7.7 FL (ref 6–12)
POTASSIUM SERPL-SCNC: 4.7 MMOL/L (ref 3.7–5.3)
PRO-BNP: 631 PG/ML
RBC # BLD: 3.82 M/UL (ref 4–5.2)
RBC # BLD: ABNORMAL 10*6/UL
SEG NEUTROPHILS: 73 % (ref 36–66)
SEGMENTED NEUTROPHILS ABSOLUTE COUNT: 4.7 K/UL (ref 1.8–7.7)
SODIUM BLD-SCNC: 133 MMOL/L (ref 135–144)
TOTAL PROTEIN: 6.5 G/DL (ref 6.4–8.3)
TROPONIN INTERP: ABNORMAL
TROPONIN T: ABNORMAL NG/ML
TROPONIN, HIGH SENSITIVITY: 20 NG/L (ref 0–14)
WBC # BLD: 6.4 K/UL (ref 3.5–11)
WBC # BLD: ABNORMAL 10*3/UL

## 2021-09-17 PROCEDURE — 2060000000 HC ICU INTERMEDIATE R&B

## 2021-09-17 PROCEDURE — 99284 EMERGENCY DEPT VISIT MOD MDM: CPT

## 2021-09-17 PROCEDURE — 93005 ELECTROCARDIOGRAM TRACING: CPT | Performed by: EMERGENCY MEDICINE

## 2021-09-17 PROCEDURE — 96372 THER/PROPH/DIAG INJ SC/IM: CPT

## 2021-09-17 PROCEDURE — 6360000002 HC RX W HCPCS: Performed by: EMERGENCY MEDICINE

## 2021-09-17 PROCEDURE — 84484 ASSAY OF TROPONIN QUANT: CPT

## 2021-09-17 PROCEDURE — 80053 COMPREHEN METABOLIC PANEL: CPT

## 2021-09-17 PROCEDURE — 83880 ASSAY OF NATRIURETIC PEPTIDE: CPT

## 2021-09-17 PROCEDURE — 2580000003 HC RX 258: Performed by: EMERGENCY MEDICINE

## 2021-09-17 PROCEDURE — 85025 COMPLETE CBC W/AUTO DIFF WBC: CPT

## 2021-09-17 PROCEDURE — 71260 CT THORAX DX C+: CPT

## 2021-09-17 PROCEDURE — 6360000004 HC RX CONTRAST MEDICATION: Performed by: EMERGENCY MEDICINE

## 2021-09-17 PROCEDURE — 36415 COLL VENOUS BLD VENIPUNCTURE: CPT

## 2021-09-17 RX ORDER — SODIUM CHLORIDE 0.9 % (FLUSH) 0.9 %
10 SYRINGE (ML) INJECTION PRN
Status: DISCONTINUED | OUTPATIENT
Start: 2021-09-17 | End: 2021-09-20 | Stop reason: HOSPADM

## 2021-09-17 RX ORDER — 0.9 % SODIUM CHLORIDE 0.9 %
70 INTRAVENOUS SOLUTION INTRAVENOUS ONCE
Status: COMPLETED | OUTPATIENT
Start: 2021-09-17 | End: 2021-09-17

## 2021-09-17 RX ADMIN — IOPAMIDOL 80 ML: 755 INJECTION, SOLUTION INTRAVENOUS at 18:16

## 2021-09-17 RX ADMIN — ENOXAPARIN SODIUM 60 MG: 60 INJECTION SUBCUTANEOUS at 19:41

## 2021-09-17 RX ADMIN — SODIUM CHLORIDE 70 ML: 9 INJECTION, SOLUTION INTRAVENOUS at 18:17

## 2021-09-17 RX ADMIN — Medication 10 ML: at 18:17

## 2021-09-17 ASSESSMENT — ENCOUNTER SYMPTOMS
SHORTNESS OF BREATH: 1
COUGH: 0
BACK PAIN: 0
NAUSEA: 0
CONSTIPATION: 0
DIARRHEA: 0
ABDOMINAL PAIN: 0
BLOOD IN STOOL: 0
EYE PAIN: 0
VOMITING: 0

## 2021-09-17 ASSESSMENT — PAIN DESCRIPTION - DESCRIPTORS: DESCRIPTORS: ACHING

## 2021-09-17 ASSESSMENT — PAIN SCALES - GENERAL: PAINLEVEL_OUTOF10: 5

## 2021-09-17 ASSESSMENT — PAIN DESCRIPTION - ORIENTATION: ORIENTATION: RIGHT

## 2021-09-17 ASSESSMENT — PAIN DESCRIPTION - PAIN TYPE: TYPE: ACUTE PAIN

## 2021-09-17 NOTE — Clinical Note
Patient Class: Inpatient [101]   REQUIRED: Diagnosis: PE [facility]embol[facility] [[id number]]   Estimated Length of Stay: Estimated stay of more than 2 midnights   Telemetry/Cardiac Monitoring Required?: Yes

## 2021-09-17 NOTE — ED NOTES
[facility] RN paging hospitalist at [address] for admission.         Itzel Keating RN  09/17/21 9484

## 2021-09-17 NOTE — ED PROVIDER NOTES
General: No tenderness. Cervical back: Normal range of motion. Skin:     General: Skin is warm and dry. Capillary Refill: Capillary refill takes less than 2 seconds. Neurological:      Mental Status: She is alert and oriented to person, place, and time.    Psychiatric:         Mood and Affect: Mood normal.         Behavior: Behavior normal.           DIFFERENTIAL DIAGNOSIS/ MDM:     Dyspnea becoming worse history of recent PE patient looks very good we will do a work-up repeat CAT scan    DIAGNOSTIC RESULTS     EKG: All EKG's are interpreted by the Emergency Department Physician who either signs or Co-signs this chart in the absence of a cardiologist.    Normal sinus rhythm rate of 78 bpm GA interval is 168 ms QRS durations 118 ms QT corrected 442 ms axis is -47 she has LVH with repolarization abnormalities there is no acute ST elevation seen  No significant difference between this and her prior EKG  RADIOLOGY:   Non-plain film images such as CT, Ultrasound and MRI are read by the radiologist. Plain radiographic images are visualized and the radiologist interpretations are reviewed as follows:         LABS:  Results for orders placed or performed during the hospital encounter of 09/17/21   Comprehensive Metabolic Panel   Result Value Ref Range    Glucose 107 (H) 70 - 99 mg/dL    BUN 19 8 - 23 mg/dL    CREATININE 1.10 (H) 0.50 - 0.90 mg/dL    Bun/Cre Ratio NOT REPORTED 9 - 20    Calcium 9.2 8.6 - 10.4 mg/dL    Sodium 133 (L) 135 - 144 mmol/L    Potassium 4.7 3.7 - 5.3 mmol/L    Chloride 100 98 - 107 mmol/L    CO2 23 20 - 31 mmol/L    Anion Gap 10 9 - 17 mmol/L    Alkaline Phosphatase 107 (H) 35 - 104 U/L    ALT 16 5 - 33 U/L    AST 17 <32 U/L    Total Bilirubin 0.20 (L) 0.3 - 1.2 mg/dL    Total Protein 6.5 6.4 - 8.3 g/dL    Albumin 3.3 (L) 3.5 - 5.2 g/dL    Albumin/Globulin Ratio 1.0 1.0 - 2.5    GFR Non- 47 (L) >60 mL/min    GFR  56 (L) >60 mL/min    GFR Comment GFR Staging NOT REPORTED    CBC Auto Differential   Result Value Ref Range    WBC 6.4 3.5 - 11.0 k/uL    RBC 3.82 (L) 4.0 - 5.2 m/uL    Hemoglobin 11.7 (L) 12.0 - 16.0 g/dL    Hematocrit 34.9 (L) 36 - 46 %    MCV 91.3 80 - 100 fL    MCH 30.6 26 - 34 pg    MCHC 33.5 31 - 37 g/dL    RDW 14.0 12.5 - 15.4 %    Platelets 109 232 - 878 k/uL    MPV 7.7 6.0 - 12.0 fL    NRBC Automated NOT REPORTED per 100 WBC    Differential Type NOT REPORTED     Immature Granulocytes NOT REPORTED 0 %    Absolute Immature Granulocyte NOT REPORTED 0.00 - 0.30 k/uL    WBC Morphology NOT REPORTED     RBC Morphology NOT REPORTED     Platelet Estimate NOT REPORTED     Seg Neutrophils 73 (H) 36 - 66 %    Lymphocytes 16 (L) 24 - 44 %    Monocytes 8 2 - 11 %    Eosinophils % 2 1 - 4 %    Basophils 1 0 - 2 %    Segs Absolute 4.70 1.8 - 7.7 k/uL    Absolute Lymph # 1.00 1.0 - 4.8 k/uL    Absolute Mono # 0.50 0.1 - 1.2 k/uL    Absolute Eos # 0.10 0.0 - 0.4 k/uL    Basophils Absolute 0.00 0.0 - 0.2 k/uL   Brain Natriuretic Peptide   Result Value Ref Range    Pro- (H) <300 pg/mL    BNP Interpretation Pro-BNP Reference Range:    Troponin   Result Value Ref Range    Troponin, High Sensitivity 20 (H) 0 - 14 ng/L    Troponin T NOT REPORTED <0.03 ng/mL    Troponin Interp NOT REPORTED    Comprehensive Metabolic Panel w/ Reflex to MG   Result Value Ref Range    Glucose 123 (H) 70 - 99 mg/dL    BUN 15 8 - 23 mg/dL    CREATININE 1.07 (H) 0.50 - 0.90 mg/dL    Bun/Cre Ratio 14 9 - 20    Calcium 8.5 (L) 8.6 - 10.4 mg/dL    Sodium 129 (L) 135 - 144 mmol/L    Potassium 4.6 3.7 - 5.3 mmol/L    Chloride 100 98 - 107 mmol/L    CO2 22 20 - 31 mmol/L    Anion Gap 7 (L) 9 - 17 mmol/L    Alkaline Phosphatase 93 35 - 104 U/L    ALT 14 5 - 33 U/L    AST 15 <32 U/L    Total Bilirubin 0.26 (L) 0.3 - 1.2 mg/dL    Total Protein 5.5 (L) 6.4 - 8.3 g/dL    Albumin 2.8 (L) 3.5 - 5.2 g/dL    Albumin/Globulin Ratio NOT REPORTED 1.0 - 2.5    GFR Non- 48 (L) >60 mL/min    GFR African American 58 (L) >60 mL/min    GFR Comment          GFR Staging NOT REPORTED    Magnesium   Result Value Ref Range    Magnesium 1.8 1.6 - 2.6 mg/dL   CBC   Result Value Ref Range    WBC 7.0 3.5 - 11.3 k/uL    RBC 3.43 (L) 3.95 - 5.11 m/uL    Hemoglobin 10.1 (L) 11.9 - 15.1 g/dL    Hematocrit 31.1 (L) 36.3 - 47.1 %    MCV 90.7 82.6 - 102.9 fL    MCH 29.4 25.2 - 33.5 pg    MCHC 32.5 28.4 - 34.8 g/dL    RDW 13.2 11.8 - 14.4 %    Platelets 471 998 - 567 k/uL    MPV 9.3 8.1 - 13.5 fL    NRBC Automated 0.0 0.0 per 100 WBC   Troponin   Result Value Ref Range    Troponin, High Sensitivity 23 (H) 0 - 14 ng/L    Troponin T NOT REPORTED <0.03 ng/mL    Troponin Interp NOT REPORTED    EKG 12 Lead   Result Value Ref Range    Ventricular Rate 78 BPM    Atrial Rate 78 BPM    P-R Interval 166 ms    QRS Duration 118 ms    Q-T Interval 388 ms    QTc Calculation (Bazett) 442 ms    P Axis 64 degrees    R Axis -47 degrees    T Axis 72 degrees   EKG 12 Lead   Result Value Ref Range    Ventricular Rate 83 BPM    Atrial Rate 83 BPM    P-R Interval 158 ms    QRS Duration 120 ms    Q-T Interval 388 ms    QTc Calculation (Bazett) 455 ms    P Axis 75 degrees    R Axis -56 degrees    T Axis 77 degrees           EMERGENCY DEPARTMENT COURSE:   Vitals:    Vitals:    09/19/21 2300 09/20/21 0000 09/20/21 0400 09/20/21 0528   BP: (!) 101/46 (!) 95/48 (!) 130/56    Pulse: 63 62 70    Resp:  20 18    Temp:  98.6 °F (37 °C) 98.8 °F (37.1 °C)    TempSrc:  Oral Oral    SpO2:  93% 94%    Weight:    122 lb 6 oz (55.5 kg)   Height:         -------------------------  BP: (!) 130/56, Temp: 98.8 °F (37.1 °C), Pulse: 70, Resp: 18          CONSULTS:      PROCEDURES:  None    FINAL IMPRESSION      1. Acute pulmonary embolism with acute cor pulmonale, unspecified pulmonary embolism type ([address]    2. Acute respiratory distress    3.  Acute pulmonary embolism, unspecified pulmonary embolism type, unspecified whether acute cor pulmonale present ([address] DISPOSITION/PLAN     Admitted in stabilized condition  PATIENT REFERRED TO:  [address] [place] [address]      DISCHARGE MEDICATIONS:  Current Discharge Medication List          (Please note that portions of this note were completed with a voice recognition program.  Efforts were made to edit the dictations but occasionally words are mis-transcribed.)    Polo Ivy MD,, MD, F.A.A.E.M.   Attending Emergency Medicine Physician      Polo Ivy MD  09/20/21 0173

## 2021-09-18 PROBLEM — I10 ESSENTIAL HYPERTENSION: Status: ACTIVE | Noted: 2017-03-14

## 2021-09-18 PROBLEM — N18.9 CHRONIC KIDNEY DISEASE: Status: ACTIVE | Noted: 2018-06-05

## 2021-09-18 PROBLEM — I73.9 PAD (PERIPHERAL ARTERY DISEASE) (HCC): Status: ACTIVE | Noted: 2018-06-18

## 2021-09-18 PROBLEM — I26.99 PULMONARY EMBOLISM ASSOCIATED WITH COVID-19 (HCC): Status: ACTIVE | Noted: 2021-08-27

## 2021-09-18 PROBLEM — Q60.0 CONGENITAL SINGLE KIDNEY: Status: ACTIVE | Noted: 2019-11-24

## 2021-09-18 PROBLEM — E78.2 MIXED HYPERLIPIDEMIA: Status: ACTIVE | Noted: 2018-06-05

## 2021-09-18 PROBLEM — U07.1 COVID-19: Status: ACTIVE | Noted: 2021-08-27

## 2021-09-18 PROBLEM — I45.2 RBBB (RIGHT BUNDLE BRANCH BLOCK WITH LEFT ANTERIOR FASCICULAR BLOCK): Status: ACTIVE | Noted: 2017-11-02

## 2021-09-18 LAB
ALBUMIN SERPL-MCNC: 2.8 G/DL (ref 3.5–5.2)
ALBUMIN/GLOBULIN RATIO: ABNORMAL (ref 1–2.5)
ALP BLD-CCNC: 93 U/L (ref 35–104)
ALT SERPL-CCNC: 14 U/L (ref 5–33)
ANION GAP SERPL CALCULATED.3IONS-SCNC: 7 MMOL/L (ref 9–17)
AST SERPL-CCNC: 15 U/L
BILIRUB SERPL-MCNC: 0.26 MG/DL (ref 0.3–1.2)
BUN BLDV-MCNC: 15 MG/DL (ref 8–23)
BUN/CREAT BLD: 14 (ref 9–20)
CALCIUM SERPL-MCNC: 8.5 MG/DL (ref 8.6–10.4)
CHLORIDE BLD-SCNC: 100 MMOL/L (ref 98–107)
CO2: 22 MMOL/L (ref 20–31)
CREAT SERPL-MCNC: 1.07 MG/DL (ref 0.5–0.9)
EKG ATRIAL RATE: 78 BPM
EKG ATRIAL RATE: 83 BPM
EKG P AXIS: 64 DEGREES
EKG P AXIS: 75 DEGREES
EKG P-R INTERVAL: 158 MS
EKG P-R INTERVAL: 166 MS
EKG Q-T INTERVAL: 388 MS
EKG Q-T INTERVAL: 388 MS
EKG QRS DURATION: 118 MS
EKG QRS DURATION: 120 MS
EKG QTC CALCULATION (BAZETT): 442 MS
EKG QTC CALCULATION (BAZETT): 455 MS
EKG R AXIS: -47 DEGREES
EKG R AXIS: -56 DEGREES
EKG T AXIS: 72 DEGREES
EKG T AXIS: 77 DEGREES
EKG VENTRICULAR RATE: 78 BPM
EKG VENTRICULAR RATE: 83 BPM
GFR AFRICAN AMERICAN: 58 ML/MIN
GFR NON-AFRICAN AMERICAN: 48 ML/MIN
GFR SERPL CREATININE-BSD FRML MDRD: ABNORMAL ML/MIN/{1.73_M2}
GFR SERPL CREATININE-BSD FRML MDRD: ABNORMAL ML/MIN/{1.73_M2}
GLUCOSE BLD-MCNC: 123 MG/DL (ref 70–99)
HCT VFR BLD CALC: 31.1 % (ref 36.3–47.1)
HEMOGLOBIN: 10.1 G/DL (ref 11.9–15.1)
MAGNESIUM: 1.8 MG/DL (ref 1.6–2.6)
MCH RBC QN AUTO: 29.4 PG (ref 25.2–33.5)
MCHC RBC AUTO-ENTMCNC: 32.5 G/DL (ref 28.4–34.8)
MCV RBC AUTO: 90.7 FL (ref 82.6–102.9)
NRBC AUTOMATED: 0 PER 100 WBC
PDW BLD-RTO: 13.2 % (ref 11.8–14.4)
PLATELET # BLD: 249 K/UL (ref 138–453)
PMV BLD AUTO: 9.3 FL (ref 8.1–13.5)
POTASSIUM SERPL-SCNC: 4.6 MMOL/L (ref 3.7–5.3)
RBC # BLD: 3.43 M/UL (ref 3.95–5.11)
SODIUM BLD-SCNC: 129 MMOL/L (ref 135–144)
TOTAL PROTEIN: 5.5 G/DL (ref 6.4–8.3)
TROPONIN INTERP: ABNORMAL
TROPONIN T: ABNORMAL NG/ML
TROPONIN, HIGH SENSITIVITY: 23 NG/L (ref 0–14)
WBC # BLD: 7 K/UL (ref 3.5–11.3)

## 2021-09-18 PROCEDURE — 84484 ASSAY OF TROPONIN QUANT: CPT

## 2021-09-18 PROCEDURE — 99222 1ST HOSP IP/OBS MODERATE 55: CPT | Performed by: NURSE PRACTITIONER

## 2021-09-18 PROCEDURE — 93970 EXTREMITY STUDY: CPT

## 2021-09-18 PROCEDURE — 2580000003 HC RX 258: Performed by: NURSE PRACTITIONER

## 2021-09-18 PROCEDURE — 6370000000 HC RX 637 (ALT 250 FOR IP): Performed by: NURSE PRACTITIONER

## 2021-09-18 PROCEDURE — 93005 ELECTROCARDIOGRAM TRACING: CPT | Performed by: INTERNAL MEDICINE

## 2021-09-18 PROCEDURE — 85027 COMPLETE CBC AUTOMATED: CPT

## 2021-09-18 PROCEDURE — 2060000000 HC ICU INTERMEDIATE R&B

## 2021-09-18 PROCEDURE — 36415 COLL VENOUS BLD VENIPUNCTURE: CPT

## 2021-09-18 PROCEDURE — 83735 ASSAY OF MAGNESIUM: CPT

## 2021-09-18 PROCEDURE — 6370000000 HC RX 637 (ALT 250 FOR IP): Performed by: INTERNAL MEDICINE

## 2021-09-18 PROCEDURE — 6370000000 HC RX 637 (ALT 250 FOR IP): Performed by: FAMILY MEDICINE

## 2021-09-18 PROCEDURE — 80053 COMPREHEN METABOLIC PANEL: CPT

## 2021-09-18 RX ORDER — SODIUM CHLORIDE 9 MG/ML
25 INJECTION, SOLUTION INTRAVENOUS PRN
Status: DISCONTINUED | OUTPATIENT
Start: 2021-09-18 | End: 2021-09-20 | Stop reason: HOSPADM

## 2021-09-18 RX ORDER — POTASSIUM CHLORIDE 7.45 MG/ML
10 INJECTION INTRAVENOUS PRN
Status: DISCONTINUED | OUTPATIENT
Start: 2021-09-18 | End: 2021-09-18

## 2021-09-18 RX ORDER — AMLODIPINE BESYLATE 5 MG/1
5 TABLET ORAL DAILY
Status: DISCONTINUED | OUTPATIENT
Start: 2021-09-18 | End: 2021-09-20 | Stop reason: HOSPADM

## 2021-09-18 RX ORDER — LANOLIN ALCOHOL/MO/W.PET/CERES
1.5 CREAM (GRAM) TOPICAL NIGHTLY PRN
Status: DISCONTINUED | OUTPATIENT
Start: 2021-09-18 | End: 2021-09-20 | Stop reason: HOSPADM

## 2021-09-18 RX ORDER — ACETAMINOPHEN 325 MG/1
650 TABLET ORAL EVERY 6 HOURS PRN
Status: DISCONTINUED | OUTPATIENT
Start: 2021-09-18 | End: 2021-09-20 | Stop reason: HOSPADM

## 2021-09-18 RX ORDER — MAGNESIUM SULFATE 1 G/100ML
1000 INJECTION INTRAVENOUS PRN
Status: DISCONTINUED | OUTPATIENT
Start: 2021-09-18 | End: 2021-09-18

## 2021-09-18 RX ORDER — SODIUM CHLORIDE 0.9 % (FLUSH) 0.9 %
10 SYRINGE (ML) INJECTION PRN
Status: DISCONTINUED | OUTPATIENT
Start: 2021-09-18 | End: 2021-09-20 | Stop reason: HOSPADM

## 2021-09-18 RX ORDER — POTASSIUM CHLORIDE 20 MEQ/1
40 TABLET, EXTENDED RELEASE ORAL PRN
Status: DISCONTINUED | OUTPATIENT
Start: 2021-09-18 | End: 2021-09-18

## 2021-09-18 RX ORDER — ONDANSETRON 4 MG/1
4 TABLET, ORALLY DISINTEGRATING ORAL EVERY 8 HOURS PRN
Status: DISCONTINUED | OUTPATIENT
Start: 2021-09-18 | End: 2021-09-20 | Stop reason: HOSPADM

## 2021-09-18 RX ORDER — LISINOPRIL 40 MG/1
40 TABLET ORAL DAILY
Status: DISCONTINUED | OUTPATIENT
Start: 2021-09-18 | End: 2021-09-20 | Stop reason: HOSPADM

## 2021-09-18 RX ORDER — M-VIT,TX,IRON,MINS/CALC/FOLIC 27MG-0.4MG
1 TABLET ORAL DAILY
Status: DISCONTINUED | OUTPATIENT
Start: 2021-09-18 | End: 2021-09-18

## 2021-09-18 RX ORDER — CETIRIZINE HYDROCHLORIDE 10 MG/1
10 TABLET ORAL DAILY
Status: DISCONTINUED | OUTPATIENT
Start: 2021-09-18 | End: 2021-09-18

## 2021-09-18 RX ORDER — ACETAMINOPHEN 650 MG/1
650 SUPPOSITORY RECTAL EVERY 6 HOURS PRN
Status: DISCONTINUED | OUTPATIENT
Start: 2021-09-18 | End: 2021-09-20 | Stop reason: HOSPADM

## 2021-09-18 RX ORDER — ONDANSETRON 2 MG/ML
4 INJECTION INTRAMUSCULAR; INTRAVENOUS EVERY 6 HOURS PRN
Status: DISCONTINUED | OUTPATIENT
Start: 2021-09-18 | End: 2021-09-20 | Stop reason: HOSPADM

## 2021-09-18 RX ORDER — SODIUM CHLORIDE 0.9 % (FLUSH) 0.9 %
5-40 SYRINGE (ML) INJECTION EVERY 12 HOURS SCHEDULED
Status: DISCONTINUED | OUTPATIENT
Start: 2021-09-18 | End: 2021-09-20 | Stop reason: HOSPADM

## 2021-09-18 RX ADMIN — SODIUM CHLORIDE, PRESERVATIVE FREE 10 ML: 5 INJECTION INTRAVENOUS at 08:26

## 2021-09-18 RX ADMIN — LISINOPRIL 40 MG: 40 TABLET ORAL at 08:25

## 2021-09-18 RX ADMIN — AMLODIPINE BESYLATE 5 MG: 5 TABLET ORAL at 08:25

## 2021-09-18 RX ADMIN — SODIUM CHLORIDE, PRESERVATIVE FREE 10 ML: 5 INJECTION INTRAVENOUS at 20:52

## 2021-09-18 RX ADMIN — APIXABAN 10 MG: 5 TABLET, FILM COATED ORAL at 20:36

## 2021-09-18 RX ADMIN — Medication 5000 UNITS: at 08:25

## 2021-09-18 RX ADMIN — Medication 1.5 MG: at 20:36

## 2021-09-18 ASSESSMENT — ENCOUNTER SYMPTOMS
SINUS PAIN: 0
WHEEZING: 0
ABDOMINAL PAIN: 0
ABDOMINAL DISTENTION: 0
SINUS PRESSURE: 0
PHOTOPHOBIA: 0
NAUSEA: 0
COLOR CHANGE: 0
CHOKING: 0
VOMITING: 0
SHORTNESS OF BREATH: 1

## 2021-09-18 ASSESSMENT — PAIN SCALES - GENERAL
PAINLEVEL_OUTOF10: 0
PAINLEVEL_OUTOF10: 0

## 2021-09-18 NOTE — CARE COORDINATION
Case Management Initial Discharge Plan  Algie Apgar,             Met with:patient to discuss discharge plans. Information verified: address, contacts, phone number, , insurance Yes  PCP: Julissa Matos  Date of last visit: Upcoming visit 21    Insurance Provider: [place]    Discharge Planning    Living Arrangements:  Alone   Support Systems:  [id number] has 1 story  4-5 stairs to climb to get into front door, 0 stairs to climb to reach second floor  Location of bedroom/bathroom in home Main Floor    Patient able to perform ADL's:Independent    Current Services (outpatient & in home) None  DME equipment: walker, cane, raised TS, shower chair, O2 (unsure of supplier)  DME provider: N/A    Pharmacy: [facility]    Potential Assistance Needed:  N/A    Patient agreeable to home care: Yes  Freedom of choice provided:  yes    Prior SNF/Rehab Placement and Facility: Yes  Agreeable to SNF/Rehab: No  [place] of choice provided: n/a   Evaluation: n/a    Expected Discharge date:  21  Patient expects to be discharged to: Follow Up Appointment: Best Day/ Time:  ( or , anytime)    Transportation provider: Daughter  Transportation arrangements needed for discharge: No    Readmission Risk              Risk of Unplanned Readmission:  13             Does patient have a readmission risk score greater than 14?: No  If yes, follow-up appointment must be made within 7 days of discharge. Goal of Care:       Discharge Plan:   Met with patient at bedside to discuss d/c plans. Patient is admitted with acute respiratory distress. Bilateral LE dopplers and ECHO ordered. Eliquis is a home med; started at last ER visit. Patient lives alone and children/relatives nearby. Independent and Driving. [facility] - list provided and chose for referral to be sent to [facility]. Contacted Chani at [facility], Awaiting response.   Patient denies any DME needs at

## 2021-09-18 NOTE — PLAN OF CARE
Problem: Falls - Risk of:  Goal: Will remain free from falls  Description: Will remain free from falls  Outcome: Ongoing  Note: Pt fall risk, fall band present, falling star, safety alarm activated and in use as needed. Hourly rounding performed. Pt encouraged to use call light. See Maycol Pineda fall risk assessment. Goal: Absence of physical injury  Description: Absence of physical injury  Outcome: Ongoing  Note: Non-skid socks in place, up with assistance, bed in lowest position, bed exit & alarm as needed, provide toileting every 2 hours an d as needed. Problem: Breathing Pattern - Ineffective:  Goal: Ability to achieve and maintain a regular respiratory rate will improve  Description: Ability to achieve and maintain a regular respiratory rate will improve  Outcome: Ongoing  Note: Assess for adventitious breath sounds. Monitored SaO2 > 90%. Applied 02 per nasal cannula as needed. Elevated HOB to improve breathing as needed.

## 2021-09-18 NOTE — FLOWSHEET NOTE
Patient + daughter was present. Visit was a consult visit for communion. Patient states better, states started with covid. States fears about her son with covid states on and off a ventilator.  shared in presence, prayers, communion. Follow up as needed. 09/18/21 1209   Encounter Summary   Services provided to: Patient and family together   Referral/Consult From: Rounding   Continue Visiting   (9-18-21)   Complexity of Encounter Low   Length of Encounter 15 minutes   Spiritual Assessment Completed Yes   Routine   Type Initial   Assessment Calm; Approachable   Intervention Explored feelings, thoughts, concerns;Prayer;Sustaining presence/ Ministry of presence; Discussed illness/injury and it's impact; Discussed belief system/Anabaptism practices/ang   Outcome Expressed gratitude;Receptive;Engaged in conversation;Expressed feelings/needs/concerns   Sacraments   Communion Patient received communion;Family received communion

## 2021-09-18 NOTE — PROGRESS NOTES
Patient admitted from [facility] ED via stretcher to [facility] room 2035. Placed on heart monitor. Vital signs taken. Admission database completed. Will monitor.

## 2021-09-18 NOTE — PROGRESS NOTES
The potassium/magnesium sliding scale has been automatically discontinued per [facility] approved policy because the patient has decreased renal function (CrCl<30 ml/min). The patient's current K/Mag levels are currently:    Recent Labs     09/17/21  1650   K 4.7       Estimated Creatinine Clearance: 28 mL/min (A) (based on SCr of 1.1 mg/dL (H)). For patients with decreased renal function (below 30ml/min) needing potassium/magnesium supplementation, please order individual bolus doses with appropriate monitoring. Please contact the inpatient pharmacy at [phone] with any concerns. Thank you.     LUZ DO [facility]  9/18/2021  2:14 AM

## 2021-09-18 NOTE — DISCHARGE INSTR - COC
Continuity of Care Form    Patient Name: India Faulkner   :  1930  MRN:  [id number]    [facility] date:  2021  Discharge date:  21    Code Status Order: DNR-CC   Advance Directives:      Admitting Physician:  Montserrat Villa MD  PCP: Star Lindsey    Discharging Nurse: Dmitry Meyer Unit/Room#: 2035/5-01  Discharging Unit Phone Number: [phone]    Emergency Contact:   Extended Emergency Contact Information  Primary Emergency Contact: Aspen Rodríguez, [address] Phone: [phone]  Mobile Phone: [phone]  Relation: Child  Secondary Emergency Contact: Armando Thrasher, [address] Phone: [phone]  Mobile Phone: [phone]  Relation: Child    Past Surgical History:  History reviewed. No pertinent surgical history. Immunization History: There is no immunization history on file for this patient.     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       Isolation/Infection:   Isolation            No Isolation          Patient Infection Status       None to display            Nurse Assessment:  Last Vital Signs: BP (!) 135/42   Pulse 80   Temp 98.8 °F (37.1 °C) (Oral)   Resp 16   Ht 5' 3\" (1.6 m)   Wt 127 lb 8 oz (57.8 kg)   SpO2 94%   BMI 22.59 kg/m²     Last documented pain score (0-10 scale): Pain Level: 0  Last Weight:   Wt Readings from Last 1 Encounters:   21 127 lb 8 oz (57.8 kg)     Mental Status:  oriented    IV Access:  - None    Nursing Mobility/ADLs:  Walking   {[facility] SCUB:[id number]}  Transfer  Independent  Bathing Independent  Dressing  Independent  Toileting  Independent  Feeding  Independent  Med Admin  Independent  Med Delivery   none    Wound Care Documentation and Therapy:        Elimination:  Continence:   · Bowel: Yes  · Bladder: Yes  Urinary Catheter: None   Colostomy/Ileostomy/Ileal Conduit: No       Date of Last BM: 09/18/21  No intake or output data in the 24 hours ending 09/18/21 1212  No intake/output data recorded. Safety Concerns: At Risk for Falls    Impairments/Disabilities:      None    Nutrition Therapy:  Current Nutrition Therapy:   - Oral Diet:  General    Routes of Feeding: Oral  Liquids: No Restrictions  Daily Fluid Restriction: no  Last Modified Barium Swallow with Video (Video Swallowing Test): not done    Treatments at the Time of Hospital Discharge:   Respiratory Treatments: ***  Oxygen Therapy:  is not on home oxygen therapy.   Ventilator:    - No ventilator support    Rehab Therapies: Physical Therapy and Occupational Therapy  Weight Bearing Status/Restrictions: No weight bearing restirctions  Other Medical Equipment (for information only, NOT a DME order):  cane, walker and bath bench  Other Treatments:   1) SN for Assessment  2) SN for Medication Teaching & Compliance    Patient's personal belongings (please select all that are sent with patient):  Bean Dhaliwal RN SIGNATURE:  Electronically signed by Lacho Escalona RN on 9/20/21 at 12:46 PM EDT    CASE MANAGEMENT/SOCIAL WORK SECTION    Inpatient Status Date: 9/17/2021    Readmission Risk Assessment Score:  Readmission Risk              Risk of Unplanned Readmission:  14           Discharging to Facility/ Agency   · Name: [facility]   · Address:  · Phone: [phone]  · Fax: [phone]      / signature: Electronically signed by Sierra Lin RN on 9/18/21 at 12:12 PM EDT    PHYSICIAN SECTION    Prognosis: Good    Condition at Discharge: Stable    Rehab Potential (if transferring to Rehab): Good    Recommended Labs or Other Treatments After Discharge: PT/OT - Home care    Physician Certification: I certify the above information and transfer of Algie Apgar  is necessary for the continuing treatment of the diagnosis listed and that she requires Home Care for greater 30 days. Update Admission H&P: No change in H&P    PHYSICIAN SIGNATURE:  Electronically signed by Rama Cabral.  Kristen NIXON - Geetha Warren, PATTI - NP on 21 at 12:34 PM EDT

## 2021-09-18 NOTE — H&P
[facility]  Office: [address], DO, Shahnaz Capellan, DO, Kayden Zuñiga, DO, Arnold Bowens Blood, DO, Sudheer Villegas MD, Tejinder Matias MD, Caren Horvath MD, Samuel Cortes MD, Judy Mccann MD, Reid Michelle MD, Issac Rivera MD, Trevin Santos, DO, Sara Acevedo, DO, Deysi Flores MD,  Carol Edge DO, Mary Ceballos MD, Krystian Velez MD, Josefa Man MD, Joshua Washington MD, , George Dye MD, Santos Welch MD, Anam Hughes MD, Leonel Swain, [facility], [facility], [facility], Sonu Guzmán, CNP, Bhavesh Branham, CNS, Edgard Morgan, CNP, Brittnee German, CNP, Alek Haq, CNP, Janny Leonardo, CNP, Leno Arnold, CNP, Marian Robert PA-C, Wyatt Goss, [facility], Nadia Orosco, CNP, Lieutenant Cunningham, CNP, Akanksha Collier, CNP, Jose Elias Randhawa, CNP, Marty Mooney, CNP, Modesto Clifford, CNP, Haroon Hicks, CNP, Jorge Gr, [address]    HISTORY AND PHYSICAL EXAMINATION            Date:   9/18/2021  Patient name: Anmol Keane  Date of admission:  9/17/2021  4:45 PM  MRN:   [id number]  Account:  [de-identified]  YOB: 1930  PCP:    Suzy Richards  Room:   2035/2035-01  Code Status:    DNR-CC    Chief Complaint:     Chief Complaint   Patient presents with    Shortness of Breath       History Obtained From:     patient    History of Present Illness: Anmol Keane is a 80 y.o. Non- / non  female who presents with Shortness of Breath   and is admitted to the hospital for the management of Pulmonary embolism with acute cor pulmonale ([facility] [address] Patient had a known COVID-19 infection 3 weeks ago. Patient was seen and evaluated and sent home with supplemental oxygen and steroids. Patient returned to the emergency department approximately 4 days ago where she was found to have acute PE.   Patient spent 24 hours in the emergency department due to boarding situation and case was discussed with internal medicine and emergency physicians. Patient was discharged on oral anticoagulation and instructed to follow-up with vascular. Patient returns to the emergency department yesterday with precordial pain and exertional dyspnea. Patient is found to have a new finding of cor pulmonale on repeat CT. Patient was admitted for further evaluation. At the time my exam patient is resting comfortably and complains of no shortness of breath. Patient reports that her precordial chest pain is improved from yesterday. Patient sats are 94% on room air. Patient endorses only mild dyspnea with exertion. Patient family is very concerned with her recurrent pain and exertional dyspnea. Questions were answered to the best my ability. Further evaluation will be completed to evaluate the severity of cor pulmonale. Past Medical History:     Past Medical History:   Diagnosis Date    Chronic kidney disease     COVID-19 08/2020    Hyperlipidemia     Hypertension         Past Surgical History:     History reviewed. No pertinent surgical history. Medications Prior to Admission:     Prior to Admission medications    Medication Sig Start Date End Date Taking?  Authorizing Provider   apixaban (ELIQUIS) 5 MG TABS tablet Take 2 tablets by mouth 2 times daily for 7 days 9/14/21 9/21/21 Yes Yudith Mcqueen MD   lisinopril (PRINIVIL;ZESTRIL) 40 MG tablet Take 40 mg by mouth daily 11/6/20  Yes Historical Provider, MD   amLODIPine (NORVASC) 5 MG tablet Take 5 mg by mouth daily 6/9/21  Yes Historical Provider, MD   vitamin D (CHOLECALCIFEROL) 125 MCG (5000 UT) CAPS capsule Take 5,000 Units by mouth daily   Yes Historical Provider, MD   docusate (COLACE, DULCOLAX) 100 MG CAPS Take 100 mg by mouth 3 times daily as needed   Yes Historical Provider, MD   magnesium citrate (CITROMA) SOLN Take 296 mLs by mouth daily     Historical Provider, MD        Allergies:     Codeine and Morphine    Social History:     Tobacco:    reports that she has never smoked. She has never used smokeless tobacco.  Alcohol:      reports no history of alcohol use. Drug Use:  reports no history of drug use. Family History:     History reviewed. No pertinent family history. Review of Systems:     Positive and Negative as described in HPI. Review of Systems   Constitutional: Positive for activity change and fatigue. Negative for fever and unexpected weight change. HENT: Negative for sinus pressure and sinus pain. Eyes: Negative for photophobia and visual disturbance. Respiratory: Positive for shortness of breath. Negative for choking and wheezing. Cardiovascular: Negative for chest pain, palpitations and leg swelling. Gastrointestinal: Negative for abdominal distention, abdominal pain, nausea and vomiting. Endocrine: Negative for cold intolerance and heat intolerance. Genitourinary: Negative for pelvic pain and urgency. Musculoskeletal: Negative for arthralgias and myalgias. Skin: Negative for color change and rash. Allergic/Immunologic: Negative for immunocompromised state. Neurological: Negative for dizziness, syncope, weakness and numbness. Hematological: Negative for adenopathy. Does not bruise/bleed easily. Psychiatric/Behavioral: Negative for agitation and confusion. The patient is not nervous/anxious. Physical Exam:   BP (!) 135/42   Pulse 80   Temp 98.8 °F (37.1 °C) (Oral)   Resp 16   Ht 5' 3\" (1.6 m)   Wt 127 lb 8 oz (57.8 kg)   SpO2 94%   BMI 22.59 kg/m²   Temp (24hrs), Av.3 °F (36.8 °C), Min:98.1 °F (36.7 °C), Max:98.8 °F (37.1 °C)    No results for input(s): POCGLU in the last 72 hours. No intake or output data in the 24 hours ending 21 1144    Physical Exam  Constitutional:       General: She is not in acute distress. Appearance: Normal appearance. She is normal weight. She is not toxic-appearing. HENT:      Head: Normocephalic and atraumatic.       Right Ear: Tympanic membrane normal.      Left Ear: Tympanic membrane normal.      Nose: Nose normal.      Mouth/Throat:      Mouth: Mucous membranes are dry. Eyes:      Extraocular Movements: Extraocular movements intact. Conjunctiva/sclera: Conjunctivae normal.      Pupils: Pupils are equal, round, and reactive to light. Cardiovascular:      Rate and Rhythm: Normal rate and regular rhythm. Pulses: Normal pulses. Heart sounds: Normal heart sounds. Pulmonary:      Effort: Pulmonary effort is normal. No respiratory distress. Breath sounds: Normal breath sounds. Abdominal:      General: Abdomen is flat. Bowel sounds are normal. There is no distension. Palpations: Abdomen is soft. Tenderness: There is no abdominal tenderness. Musculoskeletal:         General: No swelling, tenderness or deformity. Normal range of motion. Cervical back: Normal range of motion and neck supple. No rigidity. Skin:     General: Skin is warm and dry. Capillary Refill: Capillary refill takes less than 2 seconds. Coloration: Skin is not jaundiced or pale. Neurological:      General: No focal deficit present. Mental Status: She is alert and oriented to person, place, and time. Mental status is at baseline.          Investigations:      Laboratory Testing:  Recent Results (from the past 24 hour(s))   EKG 12 Lead    Collection Time: 09/17/21  4:47 PM   Result Value Ref Range    Ventricular Rate 78 BPM    Atrial Rate 78 BPM    P-R Interval 166 ms    QRS Duration 118 ms    Q-T Interval 388 ms    QTc Calculation (Bazett) 442 ms    P Axis 64 degrees    R Axis -47 degrees    T Axis 72 degrees   Comprehensive Metabolic Panel    Collection Time: 09/17/21  4:50 PM   Result Value Ref Range    Glucose 107 (H) 70 - 99 mg/dL    BUN 19 8 - 23 mg/dL    CREATININE 1.10 (H) 0.50 - 0.90 mg/dL    Bun/Cre Ratio NOT REPORTED 9 - 20    Calcium 9.2 8.6 - 10.4 mg/dL    Sodium 133 (L) 135 - 144 mmol/L    Potassium 4.7 3.7 - 5.3 mmol/L    Chloride 100 98 - 107 mmol/L    CO2 23 20 - 31 mmol/L    Anion Gap 10 9 - 17 mmol/L    Alkaline Phosphatase 107 (H) 35 - 104 U/L    ALT 16 5 - 33 U/L    AST 17 <32 U/L    Total Bilirubin 0.20 (L) 0.3 - 1.2 mg/dL    Total Protein 6.5 6.4 - 8.3 g/dL    Albumin 3.3 (L) 3.5 - 5.2 g/dL    Albumin/Globulin Ratio 1.0 1.0 - 2.5    GFR Non- 47 (L) >60 mL/min    GFR  56 (L) >60 mL/min    GFR Comment          GFR Staging NOT REPORTED    CBC Auto Differential    Collection Time: 09/17/21  4:50 PM   Result Value Ref Range    WBC 6.4 3.5 - 11.0 k/uL    RBC 3.82 (L) 4.0 - 5.2 m/uL    Hemoglobin 11.7 (L) 12.0 - 16.0 g/dL    Hematocrit 34.9 (L) 36 - 46 %    MCV 91.3 80 - 100 fL    MCH 30.6 26 - 34 pg    MCHC 33.5 31 - 37 g/dL    RDW 14.0 12.5 - 15.4 %    Platelets 410 751 - 036 k/uL    MPV 7.7 6.0 - 12.0 fL    NRBC Automated NOT REPORTED per 100 WBC    Differential Type NOT REPORTED     Immature Granulocytes NOT REPORTED 0 %    Absolute Immature Granulocyte NOT REPORTED 0.00 - 0.30 k/uL    WBC Morphology NOT REPORTED     RBC Morphology NOT REPORTED     Platelet Estimate NOT REPORTED     Seg Neutrophils 73 (H) 36 - 66 %    Lymphocytes 16 (L) 24 - 44 %    Monocytes 8 2 - 11 %    Eosinophils % 2 1 - 4 %    Basophils 1 0 - 2 %    Segs Absolute 4.70 1.8 - 7.7 k/uL    Absolute Lymph # 1.00 1.0 - 4.8 k/uL    Absolute Mono # 0.50 0.1 - 1.2 k/uL    Absolute Eos # 0.10 0.0 - 0.4 k/uL    Basophils Absolute 0.00 0.0 - 0.2 k/uL   Brain Natriuretic Peptide    Collection Time: 09/17/21  4:50 PM   Result Value Ref Range    Pro- (H) <300 pg/mL    BNP Interpretation Pro-BNP Reference Range:    Troponin    Collection Time: 09/17/21  4:50 PM   Result Value Ref Range    Troponin, High Sensitivity 20 (H) 0 - 14 ng/L    Troponin T NOT REPORTED <0.03 ng/mL    Troponin Interp NOT REPORTED    Comprehensive Metabolic Panel w/ Reflex to MG    Collection Time: 09/18/21  3:24 AM   Result Value Ref Range    Glucose 123 (H) 70 - 99 mg/dL    BUN 15 8 - 23 mg/dL    CREATININE 1.07 (H) 0.50 - 0.90 mg/dL    Bun/Cre Ratio 14 9 - 20    Calcium 8.5 (L) 8.6 - 10.4 mg/dL    Sodium 129 (L) 135 - 144 mmol/L    Potassium 4.6 3.7 - 5.3 mmol/L    Chloride 100 98 - 107 mmol/L    CO2 22 20 - 31 mmol/L    Anion Gap 7 (L) 9 - 17 mmol/L    Alkaline Phosphatase 93 35 - 104 U/L    ALT 14 5 - 33 U/L    AST 15 <32 U/L    Total Bilirubin 0.26 (L) 0.3 - 1.2 mg/dL    Total Protein 5.5 (L) 6.4 - 8.3 g/dL    Albumin 2.8 (L) 3.5 - 5.2 g/dL    Albumin/Globulin Ratio NOT REPORTED 1.0 - 2.5    GFR Non- 48 (L) >60 mL/min    GFR  58 (L) >60 mL/min    GFR Comment          GFR Staging NOT REPORTED    Magnesium    Collection Time: 09/18/21  3:24 AM   Result Value Ref Range    Magnesium 1.8 1.6 - 2.6 mg/dL   CBC    Collection Time: 09/18/21  3:24 AM   Result Value Ref Range    WBC 7.0 3.5 - 11.3 k/uL    RBC 3.43 (L) 3.95 - 5.11 m/uL    Hemoglobin 10.1 (L) 11.9 - 15.1 g/dL    Hematocrit 31.1 (L) 36.3 - 47.1 %    MCV 90.7 82.6 - 102.9 fL    MCH 29.4 25.2 - 33.5 pg    MCHC 32.5 28.4 - 34.8 g/dL    RDW 13.2 11.8 - 14.4 %    Platelets 121 073 - 607 k/uL    MPV 9.3 8.1 - 13.5 fL    NRBC Automated 0.0 0.0 per 100 WBC   Troponin    Collection Time: 09/18/21  3:24 AM   Result Value Ref Range    Troponin, High Sensitivity 23 (H) 0 - 14 ng/L    Troponin T NOT REPORTED <0.03 ng/mL    Troponin Interp NOT REPORTED    EKG 12 Lead    Collection Time: 09/18/21  7:31 AM   Result Value Ref Range    Ventricular Rate 83 BPM    Atrial Rate 83 BPM    P-R Interval 158 ms    QRS Duration 120 ms    Q-T Interval 388 ms    QTc Calculation (Bazett) 455 ms    P Axis 75 degrees    R Axis -56 degrees    T Axis 77 degrees       Imaging/Diagnostics:  CT CHEST PULMONARY EMBOLISM W CONTRAST    Result Date: 9/17/2021  1. New acute pulmonary embolism to branches of the right upper lobe and lateral segment right middle lobe.  2. 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 Pulmonary embolism with acute cor pulmonale secondary to COVID-19  1. Continue oral anticoagulation  2. Check cardiac echo to evaluate severity of cor pulmonale  3. Check bilateral lower extremity duplex ultrasounds to evaluate for PE and the need for potential vascular involvement  4. Submental oxygen to maintain sats greater than 94%  5. PT/OT  2. Hypertension with hyperlipidemia  1. Continue home medication regiment, currently well controlled  3. Congenital single kidney with CKD  1. Stable, continue to monitor  4. Peripheral artery disease  1. Continue home regimen    Consultations:   IP CONSULT TO Abby    Patient is admitted as inpatient status because of co-morbidities listed above, severity of signs and symptoms as outlined, requirement for current medical therapies and most importantly because of direct risk to patient if care not provided in a hospital setting. Expected length of stay > 48 hours.     PATTI Andres NP  9/18/2021  11:44 AM    Copy sent to Dr. Adolfo Gan

## 2021-09-18 NOTE — ACP (ADVANCE CARE PLANNING)
Advance Care Planning     Advance Care Planning Activator (Inpatient)  Conversation Note      Date of ACP Conversation: 9/18/2021     Conversation Conducted with: Patient with Decision Making Capacity    ACP Activator: Sierra Lin RN    When Decision Maker makes decisions on behalf of the incapacitated patient: Decision Maker is asked to consider and make decisions based on patient values, known preferences, or best interests. Health Care Decision Maker:     Current Designated Health Care Decision Maker:     Primary Decision Maker: Yanely Parks - Child   # [phone]  Click here to complete Healthcare Decision Makers including section of the Healthcare Decision Maker Relationship (ie \"Primary\")  Today we documented Decision Maker(s) consistent with Legal Next of Kin hierarchy. Care Preferences    Ventilation: \"If you were in your present state of health and suddenly became very ill and were unable to breathe on your own, what would your preference be about the use of a ventilator (breathing machine) if it were available to you? \"      Would the patient desire the use of ventilator (breathing machine)?: no    \"If your health worsens and it becomes clear that your chance of recovery is unlikely, what would your preference be about the use of a ventilator (breathing machine) if it were available to you? \"     Would the patient desire the use of ventilator (breathing machine)?: No      Resuscitation  \"CPR works best to restart the heart when there is a sudden event, like a heart attack, in someone who is otherwise healthy. Unfortunately, CPR does not typically restart the heart for people who have serious health conditions or who are very sick. \"    \"In the event your heart stopped as a result of an underlying serious health condition, would you want attempts to be made to restart your heart (answer \"yes\" for attempt to resuscitate) or would you prefer a natural death (answer \"no\" for do not attempt to resuscitate)? \" no       [] Yes   [] No   Educated Patient / [place] regarding differences between Advance Directives and portable DNR orders. Length of ACP Conversation in minutes: <5     Conversation Outcomes:  [x] ACP discussion completed  [] Existing advance directive reviewed with patient; no changes to patient's previously recorded wishes  [] New Advance Directive completed  [] Portable Do Not Rescitate prepared for Provider review and signature  [] POLST/POST/MOLST/MOST prepared for Provider review and signature      Follow-up plan:    [] Schedule follow-up conversation to continue planning  [] Referred individual to Provider for additional questions/concerns   [] Advised patient/agent/surrogate to review completed ACP document and update if needed with changes in condition, patient preferences or care setting    [] This note routed to one or more involved healthcare providers    If the relationship to the patient does NOT follow your state's Next of Kin hierarchy, the patient must complete an ACP document to allow him/her to act on the patient's behalf. If the patient has a valid advance directive AND verbally provides inconsistent care preference(s), advise the patient to either create a new advance directive or to orally revoke that prior directive.

## 2021-09-19 PROCEDURE — 2060000000 HC ICU INTERMEDIATE R&B

## 2021-09-19 PROCEDURE — 99232 SBSQ HOSP IP/OBS MODERATE 35: CPT | Performed by: NURSE PRACTITIONER

## 2021-09-19 PROCEDURE — 6370000000 HC RX 637 (ALT 250 FOR IP): Performed by: FAMILY MEDICINE

## 2021-09-19 PROCEDURE — 6370000000 HC RX 637 (ALT 250 FOR IP): Performed by: NURSE PRACTITIONER

## 2021-09-19 PROCEDURE — 6370000000 HC RX 637 (ALT 250 FOR IP): Performed by: INTERNAL MEDICINE

## 2021-09-19 PROCEDURE — 2580000003 HC RX 258: Performed by: NURSE PRACTITIONER

## 2021-09-19 RX ADMIN — APIXABAN 10 MG: 5 TABLET, FILM COATED ORAL at 21:48

## 2021-09-19 RX ADMIN — SODIUM CHLORIDE, PRESERVATIVE FREE 10 ML: 5 INJECTION INTRAVENOUS at 09:15

## 2021-09-19 RX ADMIN — APIXABAN 10 MG: 5 TABLET, FILM COATED ORAL at 09:14

## 2021-09-19 RX ADMIN — LISINOPRIL 40 MG: 40 TABLET ORAL at 09:14

## 2021-09-19 RX ADMIN — AMLODIPINE BESYLATE 5 MG: 5 TABLET ORAL at 09:14

## 2021-09-19 RX ADMIN — Medication 5000 UNITS: at 09:14

## 2021-09-19 RX ADMIN — Medication 1.5 MG: at 21:48

## 2021-09-19 RX ADMIN — SODIUM CHLORIDE, PRESERVATIVE FREE 10 ML: 5 INJECTION INTRAVENOUS at 21:50

## 2021-09-19 ASSESSMENT — PAIN SCALES - GENERAL
PAINLEVEL_OUTOF10: 0

## 2021-09-19 NOTE — PLAN OF CARE
Problem: Falls - Risk of:  Goal: Will remain free from falls  Description: Will remain free from falls  9/19/2021 1655 by Myron Pate RN  Outcome: Met This Shift  9/19/2021 0323 by Malachi Mendieta RN  Outcome: Met This Shift  Patient is a fall risk during this admission. Fall risk assessment was performed. Patient is absent of falls. Bed is in the lowest position. Wheels on the bed are locked. Call light and bed side table are within reach. Clutter is removed. Patient was educated to call out when needing assistance or wanting to get out of bed. Patient offered toileting assistance during rounding. Hourly rounds have been performed. Problem: Breathing Pattern - Ineffective:  Goal: Ability to achieve and maintain a regular respiratory rate will improve  Description: Ability to achieve and maintain a regular respiratory rate will improve  9/19/2021 1655 by Myron Pate RN  Outcome: Met This Shift  Pt on room air during shift with sp02 >92%. Pt educated to call out with SOB . SPO2 continues monitor in place.

## 2021-09-19 NOTE — PROGRESS NOTES
Occupational Therapy  [address]  Occupational Therapy Not Seen Note    Patient not available for Occupational Therapy due to:    [] Testing:    [] Hemodialysis    [] Cancelled by RN:    []Refusal by Patient:    [] Surgery:     [] Intubation:     [] Pain Medication:    [] Sedation:     [] Spine Precautions :    [] Medical Instability:    [x] Other: 9/19: (CX) Defer therapy eval until doppler results known. Will continue to follow.     Marc Cavanaugh, OT

## 2021-09-19 NOTE — PROGRESS NOTES
Physical Therapy  DATE: 2021    NAME: Marco Gutierrez  MRN: [id number]   : 1930    Patient not seen this date for Physical Therapy due to:  [] Blood transfusion in progress  [] Cancel by RN  [] Hemodialysis  []  Refusal by Patient   [] Spine Precautions   [] Strict Bedrest  [] Surgery  [] Testing      [x] Other Defer therapy eval until doppler results known      [] PT being discontinued at this time. Patient independent. No further needs. [] PT being discontinued at this time as the patient has been transferred to hospice care. No further needs.     Tra Soni, PT

## 2021-09-19 NOTE — PROGRESS NOTES
[facility]  Office: [address], DO, Giles Crains, DO, Salome Buenoint, DO, Geremias Light Blood, DO, Vahe Gonzalez MD, Queta Chin MD, Cassandra Puente MD, Feliz Preston MD, Mallika Harper MD, Renetta Nickersno MD, Kari Locke MD, Lamonte Cooks, DO, Bouchra Mendoza, DO, Eric Jeong MD,  Gordo Matos, DO, Aparna Elizabeth MD, Farnaz Arias MD, Leticia Palumbo MD, Rony Miller MD, , Sunni Villanueva MD, Leisa Gray MD, Arlen Carreon MD, Gomez Sky [facility], [facility], CNP, Andre Diane, CNP, Efren Watters, CNS, Cecilia De Los Santos, CNP, Mercedes Tirado, CNP, Freddy Whiting, CNP, Emily Hickman, CNP, Parul Loredo, CNP, Maryam Solis PA-C, Myron Overton, [facility], Rayray Santana, CNP, Vicente Graham, CNP, Rocio Rand, CNP, Gretchen Chauhan, CNP, Mallorie Gilliland, CNP, Nino Jarrett, CNP, Rubi Schmitz, CNP, Lo Duran, [facility]    Progress Note    9/19/2021    9:44 AM    Name:   Toña Suresh  MRN:     [id number]     Acct:      [de-identified]   Room:   2035/2035-01   Day:  2  Admit Date:  9/17/2021  4:45 PM    PCP:   Dominic Oar  Code Status:  DNR-CC    Subjective:     C/C:   Chief Complaint   Patient presents with    Shortness of Breath     Interval History Status: significantly improved. Significant improvement in condition. Patient denies chest pain this morning. Patient only reports minimal exertional dyspnea. Patient no longer requiring supplemental oxygen. Oral anticoagulation is going well. Cardiac echo unavailable due to weekend staffing schedule and patient will remain in the hospital to evaluate cardiac function secondary to a new diagnosis of cor pulmonale. Overall patient is doing very well. Case is discussed at length with the patient and their is some apparent anxiety and knowledge deficit among family members.   This is understandable as the patient's son is currently ventilator dependent secondary to Covid and there is concern about her wellbeing due to her recent diagnosis. Brief History:     9/18 -diagnosed with Covid 3 weeks ago. Treated with supplemental oxygen and steroids. 4 days ago patient return to emergency department with chest pain or shortness of breath without acute PE. Patient was started on oral anticoagulation and discharged. Patient returns to the emergency department with worsening chest pain and repeat CT shows new acute PE with cor pulmonale. Patient is admitted. 9/19 -significant improvement. Respiratory status stable. Cardiac echo scheduled for tomorrow. Anticipate discharge unless gross abnormalities identified. Review of Systems:     Constitutional:  negative for chills, fevers, sweats  Respiratory:  negative for cough, dyspnea on exertion, shortness of breath, wheezing  Cardiovascular:  negative for chest pain, chest pressure/discomfort, lower extremity edema, palpitations  Gastrointestinal:  negative for abdominal pain, constipation, diarrhea, nausea, vomiting  Neurological:  negative for dizziness, headache    Medications: Allergies: Allergies   Allergen Reactions    Codeine     Morphine Nausea Only and Nausea And Vomiting       Current Meds:   Scheduled Meds:    amLODIPine  5 mg Oral Daily    lisinopril  40 mg Oral Daily    vitamin D3  5,000 Units Oral Daily    sodium chloride flush  5-40 mL IntraVENous 2 times per day    apixaban  10 mg Oral BID     Continuous Infusions:    sodium chloride       PRN Meds: sodium chloride flush, sodium chloride, ondansetron **OR** ondansetron, acetaminophen **OR** acetaminophen, magnesium hydroxide, melatonin, sodium chloride flush    Data:     Past Medical History:   has a past medical history of Chronic kidney disease, COVID-19, Hyperlipidemia, and Hypertension. Social History:   reports that she has never smoked.  She has never used smokeless tobacco. She reports that she does not drink alcohol and does not use drugs. Family History: History reviewed. No pertinent family history. Vitals:  BP (!) 139/58   Pulse 83   Temp 97.8 °F (36.6 °C) (Oral)   Resp 18   Ht 5' 3\" (1.6 m)   Wt 127 lb 8 oz (57.8 kg)   SpO2 92%   BMI 22.59 kg/m²   Temp (24hrs), Av.5 °F (36.9 °C), Min:97.7 °F (36.5 °C), Max:99.7 °F (37.6 °C)    No results for input(s): POCGLU in the last 72 hours. I/O (24Hr): No intake or output data in the 24 hours ending 21 0944    Labs:  Hematology:  Recent Labs     21  0324   WBC 6.4 7.0   RBC 3.82* 3.43*   HGB 11.7* 10.1*   HCT 34.9* 31.1*   MCV 91.3 90.7   MCH 30.6 29.4   MCHC 33.5 32.5   RDW 14.0 13.2    249   MPV 7.7 9.3     Chemistry:  Recent Labs     21  0324   * 129*   K 4.7 4.6    100   CO2 23 22   GLUCOSE 107* 123*   BUN 19 15   CREATININE 1.10* 1.07*   MG  --  1.8   ANIONGAP 10 7*   LABGLOM 47* 48*   GFRAA 56* 58*   CALCIUM 9.2 8.5*   PROBNP 631*  --    TROPHS 20* 23*     Recent Labs     21  0324   PROT 6.5 5.5*   LABALBU 3.3* 2.8*   AST 17 15   ALT 16 14   ALKPHOS 107* 93   BILITOT 0.20* 0.26*     ABG:No results found for: POCPH, PHART, PH, POCPCO2, SPG2GXK, PCO2, POCPO2, PO2ART, PO2, POCHCO3, GPO3FZV, HCO3, NBEA, PBEA, BEART, BE, THGBART, THB, SFA2LMP, FKWL4VOK, Q6VOKDUK, O2SAT, FIO2  No results found for: SPECIAL  No results found for: CULTURE    Radiology:  CT CHEST PULMONARY EMBOLISM W CONTRAST    Result Date: 2021  1. New acute pulmonary embolism to branches of the right upper lobe and lateral segment right middle lobe. 2. Persistent or recurrent right heart strain evident with increased RV/LV ratio. 3. The well-defined, subsegmental branch pulmonary emboli demonstrated previously in the lower lungs have resolved. 4. Main pulmonary artery dilatation can be seen with pulmonary hypertension but is nonspecific. 5. Cardiomegaly.  6. No significant interval change in diffuse pulmonary infiltrates presumably reflecting COVID-19 pneumonia/ARDS sequela. Note that CT pulmonary findings of COVID-19 show overlap with other disease entities including H1N1 influenza, other viral pneumonia (i.e. adenovirus, CMV), organizing pneumonia, alveolar proteinosis and acute interstitial pneumonitis. 7. Small to moderate volume pericardial effusion. Critical results were called by Dr. Bhavehs Nath to [facility] on 9/17/2021 at 18:41. CT CHEST PULMONARY EMBOLISM W CONTRAST    Result Date: 9/13/2021  1. Segmental and subsegmental pulmonary emboli within the right upper lobe, and right and left lower lobes, without evidence of RV strain 2. Multiple ground-glass opacities and areas of consolidation bilaterally, differential considerations include COVID-19 pulmonary disease versus multifocal pneumonia 3. Small bilateral pleural effusions 4. Extensive coronary arterial calcifications 5. Critical results were called by Dr. Genevieve Chapman to Dr Ashanti Anne on 9/13/2021 at 5:12 p.m. hours. RECOMMENDATIONS: 1.3 cm incidental left thyroid nodule. No follow-up imaging is recommended. Reference: J Am Trina Radiol.  2015 Feb;12(2): 143-50       Physical Examination:        General appearance:  alert, cooperative and no distress  Mental Status:  oriented to person, place and time and normal affect  Lungs:  clear to auscultation bilaterally, normal effort  Heart:  regular rate and rhythm, no murmur  Abdomen:  soft, nontender, nondistended, normal bowel sounds, no masses, hepatomegaly, splenomegaly  Extremities:  no edema, redness, tenderness in the calves  Skin:  no gross lesions, rashes, induration    Assessment:        Hospital Problems         Last Modified POA    * (Principal) Pulmonary embolism with acute cor pulmonale ([address] 9/18/2021 Yes    PE (pulmonary thromboembolism) ([address] 9/17/2021 Yes    Chronic kidney disease 9/18/2021 Yes    Congenital single kidney 9/18/2021 Yes    [facility] 9/18/2021 Yes    Essential hypertension 9/18/2021 Yes    Mixed hyperlipidemia 9/18/2021 Yes    PAD (peripheral artery disease) [facility] 9/18/2021 Yes    RBBB (right bundle branch block with left anterior fascicular block) 9/18/2021 Yes          Plan:        1. Pulmonary embolism with acute cor pulmonale secondary to COVID-19  1. Continue oral anticoagulation  2. Check cardiac echo to evaluate severity of cor pulmonale  3. Check bilateral lower extremity duplex ultrasounds to evaluate for PE and the need for potential vascular involvement  4. Submental oxygen to maintain sats greater than 94%  5. PT/OT  2. Hypertension with hyperlipidemia  1. Continue home medication regiment, currently well controlled  3. Congenital single kidney with CKD  1. Stable, continue to monitor  4. Peripheral artery disease  1.  Continue home regimen    PATTI Dhillon NP  9/19/2021  9:44 AM

## 2021-09-19 NOTE — PLAN OF CARE
Problem: Falls - Risk of:  Goal: Will remain free from falls  Description: Will remain free from falls  Outcome: Met This Shift  Goal: Absence of physical injury  Description: Absence of physical injury  Outcome: Met This Shift     Problem: Breathing Pattern - Ineffective:  Goal: Ability to achieve and maintain a regular respiratory rate will improve  Description: Ability to achieve and maintain a regular respiratory rate will improve  Outcome: Ongoing

## 2021-09-20 VITALS
BODY MASS INDEX: 21.68 KG/M2 | HEART RATE: 85 BPM | OXYGEN SATURATION: 95 % | HEIGHT: 63 IN | TEMPERATURE: 97.6 F | DIASTOLIC BLOOD PRESSURE: 48 MMHG | WEIGHT: 122.38 LBS | RESPIRATION RATE: 16 BRPM | SYSTOLIC BLOOD PRESSURE: 65 MMHG

## 2021-09-20 LAB
LV EF: 55 %
LVEF MODALITY: NORMAL

## 2021-09-20 PROCEDURE — 97166 OT EVAL MOD COMPLEX 45 MIN: CPT

## 2021-09-20 PROCEDURE — 6370000000 HC RX 637 (ALT 250 FOR IP): Performed by: NURSE PRACTITIONER

## 2021-09-20 PROCEDURE — 6370000000 HC RX 637 (ALT 250 FOR IP): Performed by: INTERNAL MEDICINE

## 2021-09-20 PROCEDURE — 97116 GAIT TRAINING THERAPY: CPT

## 2021-09-20 PROCEDURE — 97110 THERAPEUTIC EXERCISES: CPT

## 2021-09-20 PROCEDURE — 2580000003 HC RX 258: Performed by: NURSE PRACTITIONER

## 2021-09-20 PROCEDURE — 97535 SELF CARE MNGMENT TRAINING: CPT

## 2021-09-20 PROCEDURE — 99232 SBSQ HOSP IP/OBS MODERATE 35: CPT | Performed by: NURSE PRACTITIONER

## 2021-09-20 PROCEDURE — 93306 TTE W/DOPPLER COMPLETE: CPT

## 2021-09-20 PROCEDURE — 94761 N-INVAS EAR/PLS OXIMETRY MLT: CPT

## 2021-09-20 PROCEDURE — 6370000000 HC RX 637 (ALT 250 FOR IP): Performed by: FAMILY MEDICINE

## 2021-09-20 PROCEDURE — APPNB30 APP NON BILLABLE TIME 0-30 MINS: Performed by: NURSE PRACTITIONER

## 2021-09-20 PROCEDURE — 97162 PT EVAL MOD COMPLEX 30 MIN: CPT

## 2021-09-20 RX ADMIN — APIXABAN 10 MG: 5 TABLET, FILM COATED ORAL at 08:02

## 2021-09-20 RX ADMIN — Medication 5000 UNITS: at 08:02

## 2021-09-20 RX ADMIN — LISINOPRIL 40 MG: 40 TABLET ORAL at 09:52

## 2021-09-20 RX ADMIN — AMLODIPINE BESYLATE 5 MG: 5 TABLET ORAL at 08:02

## 2021-09-20 RX ADMIN — SODIUM CHLORIDE, PRESERVATIVE FREE 10 ML: 5 INJECTION INTRAVENOUS at 10:03

## 2021-09-20 ASSESSMENT — PAIN SCALES - GENERAL
PAINLEVEL_OUTOF10: 0

## 2021-09-20 NOTE — FLOWSHEET NOTE
Patient receives Sacrament of the Sick (anointing) from [facility].    [facility] will follow as needed. (writer charting for [employer]     98/93/12 1491   Encounter Summary   Services provided to: Patient   Referral/Consult From: [facility]   Continue Visiting   (9/20/21 anointed)   Complexity of Encounter Low   Length of Encounter 15 minutes   Routine   Type Follow up   Sacraments   Sacrament of Sick-Anointing Anointed  (9/20/21 Fr. Harley Walden)

## 2021-09-20 NOTE — PROGRESS NOTES
[facility]  Office: [address], DO, Danyajuan Padilla, DO, Ryan Stephens, DO, Caroline Pelletier Blood, DO, Radha Foreman MD, Lisa Lopez MD, Gaby Levi MD, Mai Humphrey MD, Alla Etienne MD, Toya Courtney MD, Jeanette Stephens MD, Zoila Swanson, DO, Andrew Chaudhary DO, Chacho Soler MD,  Joann Thomas DO, Cheri Kumar MD, Denman Najjar, MD, Mo Chacon MD, John Bolton MD, , Ssii Stauffer MD, Franciso Schaumann, MD, Pramod Dukes MD, Francisco Javier Dukes, [facility], [facility], CNP, Robbie Bueno, CNP, Vanda Pena, CNS, Kody Arambula, CNP, Marcell Porras, CNP, Shila Newman, CNP, Jenny Wilder, CNP, Teofilo Stern, CNP, ASHUTOSH McgrawC, Isaac Cox, [facility], Natasha Arambula, CNP, Margaret Roman, CNP, Mariela Gomez, CNP, Efren Celaya CNP, Emanuel Enriquez CNP, Kayla Rangel CNP, Gonzalo Mcgregor, CNP, [facility]    Progress Note    9/20/2021    10:59 AM    Name:   Gurjit Chavez  MRN:     [id number]     Acct:      [de-identified]   Room:   2035/2035-01   Day:  3  Admit Date:  9/17/2021  4:45 PM    PCP:   Joaquín Koch  Code Status:  DNR-CC    Subjective:     C/C:   Chief Complaint   Patient presents with    Shortness of Breath     Interval History Status: significantly improved. Continued improvement. Respiratory status stable. Cardiac echo results pending lower extremity duplex ultrasound revealed subacute DVT to left lower extremity, likely cause of PE, continue oral anticoagulation. If no significant reduction and cardiac function patient may be discharged this afternoon. Brief History:     9/18 -diagnosed with Covid 3 weeks ago. Treated with supplemental oxygen and steroids. 4 days ago patient return to emergency department with chest pain or shortness of breath without acute PE. Patient was started on oral anticoagulation and discharged.   Patient returns to the emergency department with worsening chest pain and repeat CT shows new acute PE with cor pulmonale. Patient is admitted. 9/19 -significant improvement. Respiratory status stable. Cardiac echo scheduled for tomorrow. Anticipate discharge unless gross abnormalities identified. 9/20 -continued improvement. Respiratory status stable. Cardiac echo results pending lower extremity duplex ultrasound revealed subacute DVT to left lower extremity, likely cause of PE, continue oral anticoagulation. If no significant reduction and cardiac function patient may be discharged this afternoon. Review of Systems:     Constitutional:  negative for chills, fevers, sweats  Respiratory:  negative for cough, dyspnea on exertion, shortness of breath, wheezing  Cardiovascular:  negative for chest pain, chest pressure/discomfort, lower extremity edema, palpitations  Gastrointestinal:  negative for abdominal pain, constipation, diarrhea, nausea, vomiting  Neurological:  negative for dizziness, headache    Medications: Allergies: Allergies   Allergen Reactions    Codeine     Morphine Nausea Only and Nausea And Vomiting       Current Meds:   Scheduled Meds:    amLODIPine  5 mg Oral Daily    lisinopril  40 mg Oral Daily    vitamin D3  5,000 Units Oral Daily    sodium chloride flush  5-40 mL IntraVENous 2 times per day    apixaban  10 mg Oral BID     Continuous Infusions:    sodium chloride       PRN Meds: sodium chloride flush, sodium chloride, ondansetron **OR** ondansetron, acetaminophen **OR** acetaminophen, magnesium hydroxide, melatonin, sodium chloride flush    Data:     Past Medical History:   has a past medical history of Chronic kidney disease, COVID-19, Hyperlipidemia, and Hypertension. Social History:   reports that she has never smoked. She has never used smokeless tobacco. She reports that she does not drink alcohol and does not use drugs. Family History: History reviewed. No pertinent family history.     Vitals:  BP 91/78   Pulse 70   Temp 98.2 °F (36.8 °C) (Temporal)   Resp 16   Ht 5' 3\" (1.6 m)   Wt 122 lb 6 oz (55.5 kg)   SpO2 94%   BMI 21.68 kg/m²   Temp (24hrs), Av.5 °F (36.9 °C), Min:98.2 °F (36.8 °C), Max:98.8 °F (37.1 °C)    No results for input(s): POCGLU in the last 72 hours. I/O (24Hr): Intake/Output Summary (Last 24 hours) at 2021 1059  Last data filed at 2021 1256  Gross per 24 hour   Intake 250 ml   Output --   Net 250 ml       Labs:  Hematology:  Recent Labs     21  0324   WBC 6.4 7.0   RBC 3.82* 3.43*   HGB 11.7* 10.1*   HCT 34.9* 31.1*   MCV 91.3 90.7   MCH 30.6 29.4   MCHC 33.5 32.5   RDW 14.0 13.2    249   MPV 7.7 9.3     Chemistry:  Recent Labs     21  0324   * 129*   K 4.7 4.6    100   CO2 23 22   GLUCOSE 107* 123*   BUN 19 15   CREATININE 1.10* 1.07*   MG  --  1.8   ANIONGAP 10 7*   LABGLOM 47* 48*   GFRAA 56* 58*   CALCIUM 9.2 8.5*   PROBNP 631*  --    TROPHS 20* 23*     Recent Labs     21  0324   PROT 6.5 5.5*   LABALBU 3.3* 2.8*   AST 17 15   ALT 16 14   ALKPHOS 107* 93   BILITOT 0.20* 0.26*     ABG:No results found for: POCPH, PHART, PH, POCPCO2, FYB6OOQ, PCO2, POCPO2, PO2ART, PO2, POCHCO3, AED3DYR, HCO3, NBEA, PBEA, BEART, BE, THGBART, THB, STK2NOJ, WOOU4GBN, M4TNKLCY, O2SAT, FIO2  No results found for: SPECIAL  No results found for: CULTURE    Radiology:  CT CHEST PULMONARY EMBOLISM W CONTRAST    Result Date: 2021  1. New acute pulmonary embolism to branches of the right upper lobe and lateral segment right middle lobe. 2. Persistent or recurrent right heart strain evident with increased RV/LV ratio. 3. The well-defined, subsegmental branch pulmonary emboli demonstrated previously in the lower lungs have resolved. 4. Main pulmonary artery dilatation can be seen with pulmonary hypertension but is nonspecific. 5. Cardiomegaly.  6. No significant interval change in diffuse pulmonary infiltrates presumably reflecting COVID-19 pneumonia/ARDS sequela. Note that CT pulmonary findings of COVID-19 show overlap with other disease entities including H1N1 influenza, other viral pneumonia (i.e. adenovirus, CMV), organizing pneumonia, alveolar proteinosis and acute interstitial pneumonitis. 7. Small to moderate volume pericardial effusion. Critical results were called by Dr. Ananda Nash to [facility] on 9/17/2021 at 18:41. CT CHEST PULMONARY EMBOLISM W CONTRAST    Result Date: 9/13/2021  1. Segmental and subsegmental pulmonary emboli within the right upper lobe, and right and left lower lobes, without evidence of RV strain 2. Multiple ground-glass opacities and areas of consolidation bilaterally, differential considerations include COVID-19 pulmonary disease versus multifocal pneumonia 3. Small bilateral pleural effusions 4. Extensive coronary arterial calcifications 5. Critical results were called by Dr. Arely Hall to Dr Batsheva Nation on 9/13/2021 at 5:12 p.m. hours. RECOMMENDATIONS: 1.3 cm incidental left thyroid nodule. No follow-up imaging is recommended. Reference: J Am Trina Radiol.  2015 Feb;12(2): 143-50       Physical Examination:        General appearance:  alert, cooperative and no distress  Mental Status:  oriented to person, place and time and normal affect  Lungs:  clear to auscultation bilaterally, normal effort  Heart:  regular rate and rhythm, no murmur  Abdomen:  soft, nontender, nondistended, normal bowel sounds, no masses, hepatomegaly, splenomegaly  Extremities:  no edema, redness, tenderness in the calves  Skin:  no gross lesions, rashes, induration    Assessment:        Hospital Problems         Last Modified POA    * (Principal) Pulmonary embolism with acute cor pulmonale ([address] 9/18/2021 Yes    PE (pulmonary thromboembolism) ([address] 9/17/2021 Yes    Chronic kidney disease 9/18/2021 Yes    Congenital single kidney 9/18/2021 Yes    Pulmonary embolism associated with COVID-19 ([address] 9/18/2021 Yes    Essential hypertension 9/18/2021 Yes    Mixed hyperlipidemia 9/18/2021 Yes    PAD (peripheral artery disease) ([facility] 9/18/2021 Yes    RBBB (right bundle branch block with left anterior fascicular block) 9/18/2021 Yes          Plan:        1. Pulmonary embolism with acute cor pulmonale secondary to COVID-19  1. Continue oral anticoagulation  2. Check cardiac echo to evaluate severity of cor pulmonale -if function not severely reduced patient will be discharged  3. Check bilateral lower extremity duplex -revealed subacute DVT, continue anticoagulation  4. Submental oxygen to maintain sats greater than 94%  5. PT/OT  2. Hypertension with hyperlipidemia  1. Continue home medication regiment, currently well controlled  3. Congenital single kidney with CKD  1. Stable, continue to monitor  4. Peripheral artery disease  1.  Continue home regimen    PATTI Baxter NP  9/20/2021  10:59 AM

## 2021-09-20 NOTE — PLAN OF CARE
Problem: Falls - Risk of:  Goal: Will remain free from falls  Description: Will remain free from falls  Outcome: Met This Shift  Goal: Absence of physical injury  Description: Absence of physical injury  Outcome: Met This Shift     Problem: Breathing Pattern - Ineffective:  Goal: Ability to achieve and maintain a regular respiratory rate will improve  Description: Ability to achieve and maintain a regular respiratory rate will improve  Outcome: Met This Shift     Problem: Pain:  Goal: Pain level will decrease  Description: Pain level will decrease  Outcome: Met This Shift  Goal: Control of acute pain  Description: Control of acute pain  Outcome: Met This Shift  Goal: Control of chronic pain  Description: Control of chronic pain  Outcome: Met This Shift     Problem: IP BALANCE  Goal: LTG - Patient will maintain balance to allow for safe/functional mobility  9/20/2021 1501 by Margaret Lambert RN  Outcome: Met This Shift  9/20/2021 1351 by Ricky Mcfarland OT  Outcome: Ongoing

## 2021-09-20 NOTE — FLOWSHEET NOTE
Discharge Note:      All discharge instructions given at this time as well as all patient belongings returned to patient. Pt denies any further questions regarding discharge at this time. Pt given also given discharge packet  discharge instructions/restrictions and medication handouts regarding all discharge medications and side effects. Pt denies any further issues at this time. 1502-Pt wheeled out to front discharge doors at this time. Pt left premises without any issues in private vehicle at this time.

## 2021-09-20 NOTE — PROGRESS NOTES
Occupational Therapy   Occupational Therapy Initial Assessment  Date: 2021   Patient Name: Dino Ortiz  MRN: [id number]     : 1930    RN [facility] reports patient is medically stable for therapy treatment this date. Chart reviewed prior to treatment and patient is agreeable for therapy. All lines intact and patient positioned comfortably at end of treatment. All patient needs addressed prior to ending therapy session. Due to recent hospitalization and medical condition, pt would benefit from additional therapy at time of discharge to ensure safety. Please refer to the AM-PAC score for current functional status. Date of Service: 2021    Discharge Recommendations:  Patient would benefit from continued therapy after discharge  OT Equipment Recommendations  Equipment Needed: Yes  Mobility Devices: ADL Assistive Devices  ADL Assistive Devices: Reacher;Long-handled Shoe Horn;Long-handled Sponge;Emergency Alert System    Assessment   Performance deficits / Impairments: Decreased functional mobility ; Decreased safe awareness;Decreased balance;Decreased coordination;Decreased ADL status; Decreased vision/visual deficit; Decreased endurance;Decreased high-level IADLs;Decreased fine motor control;Decreased sensation  Assessment: Skilled OT is indicated to increase balance/act yunier to reduce fall risk as well as improving I and safety in function to return home with assist as needed.   Prognosis: Good  Decision Making: Medium Complexity  OT Education: OT Role;Plan of Care;Energy Conservation;Transfer Training  Patient Education: safety in function, call light use/fall prevention, pursed lip breathing, recommendations for continued therapy and benefits of being up OOB  REQUIRES OT FOLLOW UP: Yes  Activity Tolerance  Activity Tolerance: Patient limited by fatigue;Patient Tolerated treatment well  Activity Tolerance: fair minus  Safety Devices  Safety Devices in place: Yes  Type of devices: Call light within reach;Chair alarm in place; Left in chair;Patient at risk for falls;Gait belt;Nurse notified (provided pt with recliner; pt requested to)           Patient Diagnosis(es): The primary encounter diagnosis was Acute pulmonary embolism with acute cor pulmonale, unspecified pulmonary embolism type ([facility] Diagnoses of Acute respiratory distress and Acute pulmonary embolism, unspecified pulmonary embolism type, unspecified whether acute cor pulmonale present [facility]) were also pertinent to this visit. has a past medical history of Chronic kidney disease, COVID-19, Hyperlipidemia, and Hypertension. has no past surgical history on file. PER H&P: Isabela Madrid is a 80 y.o. Non- / non  female who presents with Shortness of Breath   and is admitted to the hospital for the management of Pulmonary embolism with acute cor pulmonale ([facility]    Patient had a known COVID-19 infection 3 weeks ago. Patient was seen and evaluated and sent home with supplemental oxygen and steroids. Patient returned to the emergency department approximately 4 days ago where she was found to have acute PE. Patient spent 24 hours in the emergency department due to boarding situation and case was discussed with internal medicine and emergency physicians. Patient was discharged on oral anticoagulation and instructed to follow-up with vascular. Patient returns to the emergency department yesterday with precordial pain and exertional dyspnea. Patient is found to have a new finding of cor pulmonale on repeat CT. Patient was admitted for further evaluation.      Restrictions  Restrictions/Precautions  Restrictions/Precautions: General Precautions, Fall Risk  Position Activity Restriction  Other position/activity restrictions: telemetry, up as tolerated, LUE IV, no alarm per per ANGELA Dee    Subjective   General  Chart Reviewed: Yes  Patient assessed for rehabilitation services?: Yes  Family / Caregiver Present: No  Patient Currently in Pain: Yes  Pain Assessment  Pain Assessment: 0-10  Pain Level: 0  Pre Treatment Pain Screening  Intervention List: Nurse/Physician notified  Comments / Details: Pt states she is having gas pains only due to no recent BM and no rate given.     Social/Functional History  Social/Functional History  Lives With: Alone  Type of Home: House  Home Layout: One level (goes to basement alot & has Philip rails)  Home Access: Stairs to enter without rails  Entrance Stairs - Number of Steps: 1, platform + 1, platform  Bathroom Shower/Tub: Tub/shower and pt states she prefers to take a bath and soak   Bathroom Toilet: Handicap height  Bathroom Equipment: Grab bars, Grab bars around toilet, shower chair  Home Equipment: Rolling walker, Cane, Wheelchair-manual, Oxygen (pt states she had O2 after dx with COVID but no longer needs or wears)  Receives Help From: Family (Son lives next door and pt states she has mult supportive family in area)  ADL Assistance: Independent  Homemaking Assistance: Independent  Homemaking Responsibilities: Yes  Ambulation Assistance: Independent  Transfer Assistance: Independent  Active : Yes  Mode of Transportation: Car  Occupation: Retired  Type of occupation: meat packing plant, 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  Leisure & Hobbies: plays cards with her sisters  Additional Comments: Pt reports no falls in a couple years(carrying table up the steps)       Objective   Vision: Impaired  Vision Exceptions: Wears glasses at all times (pt states she sees eye MD and gets eye drops for glaucoma)  Hearing: Within functional limits    Orientation  Overall Orientation Status: Within Functional Limits  Observation/Palpation  Posture: Good  Observation: some BUE bruises noted  Edema: none  Balance  Sitting Balance: Stand by assistance  Standing Balance: Contact guard assistance  Standing Balance  Time: stand yunier 1-2 mins  Functional Mobility  Functional - Mobility Device: No device  Activity:  (bathroom to EOB; bed to window x2 and back to bed and bed to chair provided by therapy staff)  Assist Level: Contact guard assistance  Functional Mobility Comments: MIN cues needed for pacing and pursed lip breathing as well as awareness/assist with lines to increase overall safety. Toilet Transfers  Toilet Transfers Comments: N/T and pt with no needs and stated she toileted prior to writer arrival     ADL  Feeding: Setup  Grooming: Setup;Stand by assistance  UE Bathing: Setup;Stand by assistance  LE Bathing: Setup;Contact guard assistance  UE Dressing: Setup;Minimal assistance (with hosp gown adjustment to increase overall modesty)  LE Dressing: Setup;Contact guard assistance (Pt was able to doff/gerda B socks seated EOB with set up/SBA)  Toileting: Setup;Minimal assistance (gown mgt)  Tone RUE  RUE Tone: Normotonic  Tone LUE  LUE Tone: Normotonic  Coordination  Movements Are Fluid And Coordinated: Yes  Coordination and Movement description: Fine motor impairments     Bed mobility  Supine to Sit: Unable to assess (pt in bathroom upon arrival)  Sit to Supine: Unable to assess (pt agreed to sit up in chair after edu on the benefits of being up OOB as able)  Transfers  Stand Step Transfers: Contact guard assistance (no AD)  Sit to stand: Contact guard assistance  Stand to sit: Contact guard assistance  Transfer Comments: MIN cues needed for pacing and hand placement reaching back to surface as well as awareness/assist with lines to increase safety/reduce falls. Cognition  Overall Cognitive Status: Exceptions  Arousal/Alertness: Appropriate responses to stimuli  Following Commands:  Follows all commands without difficulty  Attention Span: Appears intact  Memory: Appears intact  Safety Judgement: Decreased awareness of need for safety  Problem Solving: Assistance required to correct errors made;Assistance required to identify errors made;Decreased awareness of errors  Insights: Decreased awareness of deficits  Initiation: Does not require cues  Sequencing: Does not require cues  Perception  Overall Perceptual Status: WFL     Sensation  Overall Sensation Status: Impaired (pt reports intermittent parestheias in R hand & feet)        LUE AROM (degrees)  LUE AROM : WFL  RUE AROM (degrees)  RUE AROM : WFL  LUE Strength  Gross LUE Strength: WFL  LUE Strength Comment: BUE strength grossly 4 plus/5  RUE Strength  Gross RUE Strength: WFL                   Plan   Plan  Times per week: 4-5x/week 1x/day as yunier  Current Treatment Recommendations: Strengthening, Balance Training, Functional Mobility Training, Safety Education & Training, Endurance Training, Neuromuscular Re-education, Patient/Caregiver Education & Training, Self-Care / ADL, Equipment Evaluation, Education, & procurement, Cognitive/Perceptual Training, Pain Management, Home Management Training                                                  AM-PAC Score   19          Goals  Short term goals  Time Frame for Short term goals: by discharge, pt to demo  Short term goal 1: I with BUE HEP with use of hand outs as needed to maintain strength. Short term goal 2: total body ADL tasks with use of AE/grab bar to SUP-MOD I. Short term goal 3: I with ADL transfers and functional mob. Short term goal 4: standing to SUP and yunier > 5 mins as able to reduce falls in function. Short term goal 5: bed mob tasks with use of rail as needed to MOD I-I. Long term goals  Long term goal 1: Pt/caregivers to be I with EC/WS and fall prevention tech as well as DME/AE recommendations with use of handouts. Patient Goals   Patient goals : pt states she hopes to get home today!        Therapy Time   Individual Concurrent Group Co-treatment   Time In 1809 (plus 10 min chart review/nursing communication)         Time Out 0948         Minutes 30         Timed Code Treatment Minutes: [facility]       Garima Hernandez, OT

## 2021-09-20 NOTE — DISCHARGE SUMMARY
[facility]  Office: [address], DO, Kenyon Ma, DO, Manjit Monson, DO, Mynor Cordova [facility], DO, Irasema Perry MD, Wero Ervin MD, Lavell Olson MD, Regulo Powell MD, Jojo Wilkinson MD, Keyona Courtney MD, Ruel Loza MD, Jacqueline Vidal, DO, Marilu Anand, DO, Yaniv Noel MD,  Aaron Chiang DO, Nirmala Brewster MD, Ghulam Apple MD, Jaylyn Lentz MD, Alberta Brown MD, , Ana Nicholas MD, Nava Cintron MD, Cassie Barry MD, Yoana Yo, [facility], [facility], CNP, Laine Ricketts, CNP, Holly Scanlon, [facility], Rafiq Aranda, CNP, Nayely Cabral, CNP, Jose Millan, CNP, George Tobias, CNP, Adonay Orosco, CNP, Kacie Aguilar PA-C, Naya Noonan, [facility], Yuli Elias, CNP, Tanja Chandra, CNP, Pippa Manzo, CNP, Zna Kunz, CNP, Nathanael Mccann, CNP, Lor Costello, CNP, Giovani Claudio, [facility], Marianne Ghosh, [facility]    Discharge Summary     Patient ID: Keren Negro  :  1930   MRN: [id number]     ACCOUNT:  [de-identified]   Patient's PCP: Marjorie Fletcher  Admit Date: 2021   Discharge Date: 2021     Length of Stay: 3  Code Status:  DNR-CC  Admitting Physician: Cora Simeon MD  Discharge Physician: PATTI Olmstead - LOREN     Active Discharge Diagnoses:     Hospital Problem Lists:  Principal Problem:    Pulmonary embolism with acute cor pulmonale (HCC)  Active Problems:    [facility]    Chronic kidney disease    Congenital single kidney    Pulmonary embolism associated with COVID-19 ([facility]    Essential hypertension    Mixed hyperlipidemia    PAD (peripheral artery disease) ([facility]    RBBB (right bundle branch block with left anterior fascicular block)  Resolved Problems:    * No resolved hospital problems. *      Admission Condition:  fair     Discharged Condition: good    Hospital Stay:     Hospital Course:   Keren Negro is a 80 y.o. female who was admitted for the management of Pulmonary embolism with acute cor pulmonale ([facility] [address] [address] , presented to ER with Shortness of Breath    9/18 -diagnosed with Covid 3 weeks ago. Treated with supplemental oxygen and steroids. 4 days ago patient return to emergency department with chest pain or shortness of breath without acute PE. Patient was started on oral anticoagulation and discharged. Patient returns to the emergency department with worsening chest pain and repeat CT shows new acute PE with cor pulmonale. Patient is admitted.     9/19 -significant improvement. Respiratory status stable. Cardiac echo scheduled for tomorrow. Anticipate discharge unless gross abnormalities identified.     9/20 -continued improvement. Respiratory status stable. Cardiac echo results pending lower extremity duplex ultrasound revealed subacute DVT to left lower extremity, likely cause of PE, continue oral anticoagulation. If no significant reduction and cardiac function patient may be discharged this afternoon.       Significant therapeutic interventions: Continuation of oral anticoagulation, PT/OT, cardiac echo    Significant Diagnostic Studies:   Labs / Micro:  CBC:   Lab Results   Component Value Date    WBC 7.0 09/18/2021    RBC 3.43 09/18/2021    HGB 10.1 09/18/2021    HCT 31.1 09/18/2021    MCV 90.7 09/18/2021    MCH 29.4 09/18/2021    MCHC 32.5 09/18/2021    RDW 13.2 09/18/2021     09/18/2021     BMP:    Lab Results   Component Value Date    GLUCOSE 123 09/18/2021     09/18/2021    K 4.6 09/18/2021     09/18/2021    CO2 22 09/18/2021    ANIONGAP 7 09/18/2021    BUN 15 09/18/2021    CREATININE 1.07 09/18/2021    BUNCRER 14 09/18/2021    CALCIUM 8.5 09/18/2021    LABGLOM 48 09/18/2021    GFRAA 58 09/18/2021    GFR      09/18/2021    GFR NOT REPORTED 09/18/2021     U/A:  No results found for: Vivien Aguilar, Akin Angeles, Luis Larson, Bob Sarabia, UROBILINOGEN, NITRU, [address]     Radiology:  CT CHEST PULMONARY EMBOLISM W CONTRAST    Result Date: 9/17/2021  1. New acute pulmonary embolism to branches of the right upper lobe and lateral segment right middle lobe. 2. Persistent or recurrent right heart strain evident with increased RV/LV ratio. 3. The well-defined, subsegmental branch pulmonary emboli demonstrated previously in the lower lungs have resolved. 4. Main pulmonary artery dilatation can be seen with pulmonary hypertension but is nonspecific. 5. Cardiomegaly. 6. No significant interval change in diffuse pulmonary infiltrates presumably reflecting COVID-19 pneumonia/ARDS sequela. Note that CT pulmonary findings of COVID-19 show overlap with other disease entities including H1N1 influenza, other viral pneumonia (i.e. adenovirus, CMV), organizing pneumonia, alveolar proteinosis and acute interstitial pneumonitis. 7. Small to moderate volume pericardial effusion. Critical results were called by Dr. Raman Cordova to [facility] on 9/17/2021 at 18:41. CT CHEST PULMONARY EMBOLISM W CONTRAST    Result Date: 9/13/2021  1. Segmental and subsegmental pulmonary emboli within the right upper lobe, and right and left lower lobes, without evidence of RV strain 2. Multiple ground-glass opacities and areas of consolidation bilaterally, differential considerations include COVID-19 pulmonary disease versus multifocal pneumonia 3. Small bilateral pleural effusions 4. Extensive coronary arterial calcifications 5. Critical results were called by Dr. Shena Mora to Dr Dia Mayorga on 9/13/2021 at 5:12 p.m. hours. RECOMMENDATIONS: 1.3 cm incidental left thyroid nodule. No follow-up imaging is recommended. Reference: J Am Trina Radiol.  2015 Feb;12(2): 143-50       Consultations:    Consults:     Final Specialist Recommendations/Findings:   IP CONSULT TO SPIRITUAL SERVICES      The patient was seen and examined on day of discharge and this discharge summary is in conjunction with any daily progress note from day of discharge. Discharge plan:     Disposition: Home    Physician Follow Up:     [place] [address] [facility] [id number]  [address] [facility]    Nyasia Mathis  [address] [facility]  [place]  [phone]      As needed post hospital discharge       Requiring Further Evaluation/Follow Up POST HOSPITALIZATION/Incidental Findings:     Diet: cardiac diet    Activity: As tolerated    Instructions to Patient: Take the anticoagulation exactly as prescribed. Wear your oxygen at night and if you need it during the day. Check your oxygen levels with the pulse oximeter you have at home. Make and keep a follow-up appointment with your primary care doctor to ensure resolution. You do have a blood clot in the lower left leg that is what we call subacute. This means it is not new and it has been there for several weeks. You should keep your left leg elevated when not walking and continue to take the anticoagulation exactly as prescribed. Discharge Medications:      Medication List      CONTINUE taking these medications    amLODIPine 5 MG tablet  Commonly known as: NORVASC     apixaban 5 MG Tabs tablet  Commonly known as: Eliquis  Take 2 tablets by mouth 2 times daily for 7 days     docusate 100 MG Caps  Commonly known as: COLACE, DULCOLAX     lisinopril 40 MG tablet  Commonly known as: PRINIVIL;ZESTRIL     vitamin D 125 MCG (5000 UT) Caps capsule  Commonly known as: CHOLECALCIFEROL            No discharge procedures on file. Time Spent on discharge is  36 mins in patient examination, evaluation, counseling as well as medication reconciliation, prescriptions for required medications, discharge plan and follow up. Electronically signed by   PATTI Ceja NP  9/20/2021  12:19 PM      Thank you Dr. Nyasia Mathis for the opportunity to be involved in this patient's care.

## 2021-09-20 NOTE — PROGRESS NOTES
Physical Therapy    Facility/Department: [facility]  Initial Assessment    NAME: Toña Suresh  : 1930  MRN: [id number]    Date of Service: 2021    Discharge Recommendations:  Patient would benefit from continued therapy after discharge         Pt presented to ED on 21 with increasing shortness of breath. Pt had a known COVID-19 infection 3 weeks ago.  Pt was seen and evaluated and sent home with supplemental oxygen and steroids.  Pt returned to the emergency department approximately 4 days ago where she was found to have acute PE.  Pt spent 24 hours in the emergency department due to boarding situation and case was discussed with internal medicine and emergency physicians.  Pt was discharged on oral anticoagulation and instructed to follow-up with vascular.  Pt returns to the emergency department yesterday with precordial pain and exertional dyspnea.  Pt is found to have a new finding of cor pulmonale on repeat CT.  Patient was admitted for further evaluation.  At the time my exam patient is resting comfortably and complains of no shortness of breath.  Pt reports that her precordial chest pain is improved from yesterday.  Pt sats are 94% on room air.  Pt endorses only mild dyspnea with exertion.  Pt admitted for the management of Pulmonary embolism with acute cor pulmonale      RN reports patient is medically stable for therapy treatment this date. Chart reviewed prior to treatment and patient is agreeable for therapy. Assessment   Body structures, Functions, Activity limitations: Decreased functional mobility ; Decreased ADL status; Decreased endurance;Decreased balance  Assessment: Pt tolerated session with minor deficits noted in bed mobility, transfers, ambulation, balance, and endurance this session. Pt requires continued PT to maximize independence with functional mobility, balance, safety awareness & activity tolerance.  Due to recent hospitalization and medical condition, pt would benefit from additional therapy at time of discharge to ensure safety. Please refer to the AM-PAC score for current functional status. Prognosis: Excellent  Decision Making: Medium Complexity  Exam: ROM, MMT, functional mobility, activity tolerance, Balance, & [employer] AM-PAC 6 Clicks Basic Mobility  Clinical Presentation: evolving  PT Education: Goals;PT Role;Home Exercise Program;Functional Mobility Training;Transfer Training;Energy Conservation;General Safety;Gait Training  Patient Education: Ed pt on functional mobility, safety awareness, importance of being up & OOB to regain strength, & prevention of sedentary complications, circulation ex's,  & optimal breathing techniques; Seated & standing ex's, chair press ups, seated & standing postural ex;s & UB resistance with incorporation of breathing techniques & energy conservation(planning, pacing, prioritizing & positioning), & issued written home ex's with pt verbalizing good understanding  REQUIRES PT FOLLOW UP: Yes  Activity Tolerance  Activity Tolerance: Patient Tolerated treatment well       Patient Diagnosis(es): The primary encounter diagnosis was Acute pulmonary embolism with acute cor pulmonale, unspecified pulmonary embolism type ([address] Diagnoses of Acute respiratory distress and Acute pulmonary embolism, unspecified pulmonary embolism type, unspecified whether acute cor pulmonale present [facility]) were also pertinent to this visit. has a past medical history of Chronic kidney disease, COVID-19, Hyperlipidemia, and Hypertension. has no past surgical history on file.     Restrictions  Restrictions/Precautions  Restrictions/Precautions: General Precautions, Fall Risk  Position Activity Restriction  Other position/activity restrictions: telemetry, up as tolerated, LUE IV, no alarm per per RN Leila  Vision/Hearing  Vision: Impaired  Vision Exceptions: Wears glasses at all times (pt states she sees eye MD and gets eye drops for glaucoma)  Hearing: Within functional limits     Subjective  General  Chart Reviewed: Yes  Patient assessed for rehabilitation services?: Yes  Additional Pertinent Hx: CKD, Covid 23, HTN  General Comment  Comments: RN okays PT  Subjective  Subjective: Pt agreeable to PT  Pain Screening  Patient Currently in Pain: Yes  Vital Signs  Patient Currently in Pain: No (only gas pains)       Orientation  Orientation  Overall Orientation Status: Within Normal Limits  Social/Functional History  Social/Functional History  Lives With: Alone  Type of Home: House  Home Layout: One level (goes to basement alot & has Philip rails)  Home Access: Stairs to enter without rails  Entrance Stairs - Number of Steps: 1, platform + 1, platform  Bathroom Shower/Tub: Tub/Shower unit (prefers a tub bath)  Bathroom Toilet: Handicap height  Bathroom Equipment: Grab bars in shower, Grab bars around toilet, Shower chair  Home Equipment: Rolling walker, Connected, Wheelchair-manual, Oxygen (pt states she had O2 after dx with COVID but no longer needs or wears)  Receives Help From: Family (Son lives next door)  ADL Assistance: Independent  Homemaking Assistance: Independent  Homemaking Responsibilities: Yes  Ambulation Assistance: Independent  Transfer Assistance: Independent  Active : Yes  Mode of Transportation: Car  Occupation: Retired  Type of occupation: meat packing plant, 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  Leisure & Hobbies: plays cards with her sisters  Additional Comments: Pt reports no falls in a couple years(carrying table up the steps)  Cognition   Cognition  Overall Cognitive Status: WFL    Objective     Observation/Palpation  Posture: Good  Observation: some BUE bruises noted  Edema: none    AROM RLE (degrees)  RLE AROM: WFL  AROM LLE (degrees)  LLE AROM : WFL  AROM RUE (degrees)  RUE General AROM: See OT assessment  AROM LUE (degrees)  LUE General AROM: See OT assessment  Strength RLE  Comment: 4/5 at hip  Strength LLE  Comment: 4/5 at hip  Strength RUE  Comment: See OT assessment  Strength LUE  Comment: See OT assessment  Tone RLE  RLE Tone: Normotonic  Tone LLE  LLE Tone: Normotonic  Motor Control  Gross Motor?: WFL  Sensation  Overall Sensation Status: Impaired (pt reports intermittent parestheias in R hand & feet)  Bed mobility  Scooting: Unable to assess  Comment: unable to assess, pt out of bed upon arrival, & agreeable to sit in chair at end of session  Transfers  Sit to Stand: Contact guard assistance  Stand to sit: Contact guard assistance  Bed to Chair: Contact guard assistance  Stand Pivot Transfers: Contact guard assistance  Lateral Transfers: Contact guard assistance  Car Transfer: Contact guard assistance  Comment: pt demonstrated good use of UB for sit/stand, Ed pursed lip breathing & pacing  Ambulation  Ambulation?: Yes  Ambulation 1  Surface: level tile  Device: No Device  Assistance: Contact guard assistance  Gait Deviations: Slow Clara;Decreased step length;Decreased step height  Distance: 30ft x 2     Balance  Sitting - Static: Good  Sitting - Dynamic: Good;-  Standing - Static: Good  Standing - Dynamic: Fair;+   Return form [id number]:  Exercises  Comments: Ed home ex's:seated & standing ex's, chair press ups, seated & standing postural ex;s & UB resistance with incorporation of breathing techniques & energy conservation(planning, pacing, prioritizing & positioning), & issued written home ex's with pt verbalizing good understanding     Plan   Plan  Times per week: 1-2x/d, 5-6D/week  Current Treatment Recommendations: Strengthening, Balance Training, Functional Mobility Training, Transfer Training, Endurance Training, Gait Training, Home Exercise Program, Safety Education & Training, Patient/Caregiver Education & Training  Safety Devices  Type of devices: Gait belt, Patient at risk for falls, Call light within reach    G-Code       OutComes Score                                                  AM-PAC Score  AM-PAC Inpatient Mobility Raw Score : 19 (09/20/21 0715)  AM-PAC Inpatient T-Scale Score : 45.44 (09/20/21 0947)  Mobility Inpatient CMS 0-100% Score: 41.77 (09/20/21 0947)  Mobility Inpatient CMS G-Code Modifier : CK (09/20/21 0947)          Goals  Short term goals  Time Frame for Short term goals: 8 visits  Short term goal 1: Inc bed-mobility & transfers to independent to enable pt to safely get in/OOB & chair  Short term goal 2: Inc gait to amb 400 ft or > indep to enable pt to return to previous level of independence  Short term goal 3:  Inc strength to [address] standing balance to good to facilitate pt independence for performance of ADL's & functional mobility, & reduce fall risk  Short term goal 4: Pt able to tolerate 30-40 min of activity to include 15-20 reps of ex, NMR & functional mobility including 5 minutes of standing with to facilitate activity tolerance to [facility]  Short term goal 5: Ed pt on home ex's, safety & energy principles, fall prevention, & issue written home program       Therapy Time   Individual Concurrent Group Co-treatment   Time In 0916 ([phone])         Time Out 0947 (1205)         Minutes 31+28=59              Additional 10 minutes for chart review      Treatment time: 43 minutes       [address], PT

## 2021-09-21 ENCOUNTER — CARE COORDINATION (OUTPATIENT)
Dept: CASE MANAGEMENT | Age: 86
End: 2021-09-21

## 2021-09-21 NOTE — CARE COORDINATION
[facility] Transitions Initial Follow Up Call    Call within 2 business days of discharge: Yes    Patient: Lalita Peralta Patient : 1930   MRN: [id number]  Reason for Admission: Acute pulmonary embolism with acute cor pulmonale/Covid-19+   Discharge Date: 21 RARS: Readmission Risk Score: 15      Last Discharge [facility]       Complaint Diagnosis Description Type Department Provider    21 Shortness of Breath Acute pulmonary embolism with acute cor pulmonale, unspecified pulmonary embolism type ([address] . .. ED to Hosp-Admission (Discharged) (ADMITTED) Alecia Camacho MD; Anayeli Marquis Wor. .. Spoke with: Hanley Essex daughter    Facility: [facility]    Unable to contact Ana Meeks for initial outreach, daughter, Hanley Essex was called. She stated that her mother was doing so much better. She said that she is not wearing the oxygen that was ordered when she was in  [place] [facility] and her saturations have been 95-96%. Ana Meeks is not complaining of pain and is eating and up walking around with a walker. She has a PCP appointment tomorrow and has all her medications. Pamela caring at [facility] and [facility] is scheduled for today     Patient contacted regarding COVID-19 diagnosis. Discussed COVID-19 related testing which was available at this time. Test results were positive. Patient informed of results, if available? Yes. Care Transition Nurse contacted the family by telephone to perform post discharge assessment. Call within 2 business days of discharge: Yes. Verified name and  with family as identifiers. Provided introduction to self, and explanation of the CTN/ACM role, and reason for call due to risk factors for infection and/or exposure to COVID-19. Symptoms reviewed with family who verbalized the following symptoms: no new symptoms and no worsening symptoms. Due to no new or worsening symptoms encounter was not routed to provider for escalation. Discussed follow-up appointments.  If no appointment was previously scheduled, appointment scheduling offered: No.  [facility] follow up appointment(s): No future appointments. [facility] follow up appointment(s): 9/22 Dr Eric Santana services provided:  Scheduled appointment with PCP-9/22  Obtained and reviewed discharge summary and/or continuity of care documents  Communication with home health agencies or other community services the patient is currently using-SOC 9/22  Assessment and support for treatment adherence and medication management-reviewed     Advance Care Planning:   Does patient have an Advance Directive:  did not review. Educated patient about risk for severe COVID-19 due to risk factors according to CDC guidelines. CTN reviewed discharge instructions, medical action plan and red flag symptoms with the family who verbalized understanding. Discussed COVID vaccination status: Yes and pt was not vaccinated. Education provided on COVID-19 vaccination as appropriate. Discussed exposure protocols and quarantine with CDC Guidelines. Family was given an opportunity to verbalize any questions and concerns and agrees to contact CTN or health care provider for questions related to their healthcare. Reviewed and educated family on any new and changed medications related to discharge diagnosis     Was patient discharged with a pulse oximeter? No but daughter stated they had one Discussed and confirmed pulse oximeter discharge instructions and when to notify provider or seek emergency care. CTN provided contact information. Plan for follow-up call in 5-7 days based on severity of symptoms and risk factors. Care Transitions 24 Hour Call    Care Transitions Interventions         Follow Up  No future appointments.     Jeanne Dey RN

## 2021-09-28 ENCOUNTER — CARE COORDINATION (OUTPATIENT)
Dept: CASE MANAGEMENT | Age: 86
End: 2021-09-28

## 2021-09-28 NOTE — CARE COORDINATION
[facility] Transitions Follow Up Call    2021    Patient: David Romero  Patient : 1930   MRN: [id number]  Reason for Admission: PE, Cor Pulmonale, COVID 19 (+)  Discharge Date: 21 RARS: Readmission Risk Score: 15         Spoke with: Soha Abbasi  States she is feeling much better. Denies SOB, cough, fever. Is eating and sleeping OK. No longer requires home oxygen, SPO2 95-96%. [facility], she denies need. Care Transitions Follow Up Call    Needs to be reviewed by the provider   Additional needs identified to be addressed with provider: No  none             Method of communication with provider : none      Care Transition Nurse (CTN) contacted the patient by telephone to follow up after admission. Verified name and  with patient as identifiers. Addressed changes since last contact: symptoms resolving. Discussed follow-up appointments. If no appointment was previously scheduled, appointment scheduling offered: completed. Is follow up appointment scheduled within 7 days of discharge? Yes. Advance Care Planning:   Does patient have an Advance Directive: reviewed and current, reviewed and needs to be updated, not on file; education provided, not on file, patient declined education, decision maker updated and referral to internal ACP facilitator. CTN reviewed discharge instructions, medical action plan and red flags with patient and discussed any barriers to care and/or understanding of plan of care after discharge. Discussed appropriate site of care based on symptoms and resources available to patient including: PCP and Specialist. The patient agrees to contact the PCP office for questions related to their healthcare. Patients top risk factors for readmission: none identified  Interventions to address risk factors:     CTN provided contact information for future needs. Plan for follow-up call in 10-14 days based on severity of symptoms and risk factors.   Plan